# Patient Record
Sex: MALE | Race: WHITE | NOT HISPANIC OR LATINO | Employment: OTHER | URBAN - NONMETROPOLITAN AREA
[De-identification: names, ages, dates, MRNs, and addresses within clinical notes are randomized per-mention and may not be internally consistent; named-entity substitution may affect disease eponyms.]

---

## 2023-05-19 LAB
CHOLESTEROL (MG/DL) IN SER/PLAS: 209 MG/DL (ref 0–199)
TRIGLYCERIDE (MG/DL) IN SER/PLAS: 169 MG/DL (ref 0–149)

## 2023-05-22 ENCOUNTER — OFFICE VISIT (OUTPATIENT)
Dept: PRIMARY CARE | Facility: CLINIC | Age: 78
End: 2023-05-22
Payer: MEDICARE

## 2023-05-22 VITALS
BODY MASS INDEX: 21.9 KG/M2 | DIASTOLIC BLOOD PRESSURE: 52 MMHG | HEART RATE: 49 BPM | HEIGHT: 70 IN | WEIGHT: 153 LBS | OXYGEN SATURATION: 97 % | SYSTOLIC BLOOD PRESSURE: 100 MMHG

## 2023-05-22 DIAGNOSIS — J30.2 SEASONAL ALLERGIC RHINITIS, UNSPECIFIED TRIGGER: ICD-10-CM

## 2023-05-22 DIAGNOSIS — G25.0 ESSENTIAL TREMOR: ICD-10-CM

## 2023-05-22 DIAGNOSIS — E78.00 HYPERCHOLESTEREMIA: ICD-10-CM

## 2023-05-22 DIAGNOSIS — H25.812 COMBINED FORMS OF AGE-RELATED CATARACT OF LEFT EYE: ICD-10-CM

## 2023-05-22 DIAGNOSIS — M47.812 CERVICAL SPONDYLOSIS: ICD-10-CM

## 2023-05-22 DIAGNOSIS — I10 ESSENTIAL HYPERTENSION: ICD-10-CM

## 2023-05-22 DIAGNOSIS — M43.06 LUMBAR SPONDYLOLYSIS: ICD-10-CM

## 2023-05-22 DIAGNOSIS — M16.11 PRIMARY OSTEOARTHRITIS OF RIGHT HIP: ICD-10-CM

## 2023-05-22 DIAGNOSIS — M81.0 AGE-RELATED OSTEOPOROSIS WITHOUT CURRENT PATHOLOGICAL FRACTURE: ICD-10-CM

## 2023-05-22 DIAGNOSIS — K21.9 GASTROESOPHAGEAL REFLUX DISEASE WITHOUT ESOPHAGITIS: ICD-10-CM

## 2023-05-22 DIAGNOSIS — H35.373 EPIRETINAL MEMBRANE (ERM) OF BOTH EYES: ICD-10-CM

## 2023-05-22 DIAGNOSIS — N13.8 BENIGN PROSTATIC HYPERPLASIA WITH URINARY OBSTRUCTION AND OTHER LOWER URINARY TRACT SYMPTOMS: ICD-10-CM

## 2023-05-22 DIAGNOSIS — N40.1 BENIGN PROSTATIC HYPERPLASIA WITH URINARY OBSTRUCTION AND OTHER LOWER URINARY TRACT SYMPTOMS: ICD-10-CM

## 2023-05-22 DIAGNOSIS — Z96.1 PSEUDOPHAKIA OF RIGHT EYE: ICD-10-CM

## 2023-05-22 DIAGNOSIS — Z00.00 ROUTINE GENERAL MEDICAL EXAMINATION AT HEALTH CARE FACILITY: Primary | ICD-10-CM

## 2023-05-22 DIAGNOSIS — M15.9 PRIMARY OSTEOARTHRITIS INVOLVING MULTIPLE JOINTS: ICD-10-CM

## 2023-05-22 DIAGNOSIS — R41.3 AGE-RELATED MEMORY DISORDER: ICD-10-CM

## 2023-05-22 DIAGNOSIS — L57.0 SENILE HYPERKERATOSIS: ICD-10-CM

## 2023-05-22 DIAGNOSIS — Z12.11 COLON CANCER SCREENING: ICD-10-CM

## 2023-05-22 DIAGNOSIS — M77.50 ENTHESOPATHY OF ANKLE: ICD-10-CM

## 2023-05-22 DIAGNOSIS — M75.112 NONTRAUMATIC INCOMPLETE TEAR OF LEFT ROTATOR CUFF: ICD-10-CM

## 2023-05-22 DIAGNOSIS — J45.20 MILD INTERMITTENT REACTIVE AIRWAY DISEASE WITHOUT COMPLICATION (HHS-HCC): ICD-10-CM

## 2023-05-22 PROBLEM — M75.52 BURSITIS OF LEFT SHOULDER: Status: ACTIVE | Noted: 2023-05-22

## 2023-05-22 PROBLEM — F41.1 ANXIETY REACTION: Status: ACTIVE | Noted: 2023-05-22

## 2023-05-22 PROBLEM — Q18.1 CYST ON EAR: Status: RESOLVED | Noted: 2023-05-22 | Resolved: 2023-05-22

## 2023-05-22 PROBLEM — M75.50 BURSITIS AND TENDINITIS OF SHOULDER REGION: Status: RESOLVED | Noted: 2021-08-11 | Resolved: 2023-05-22

## 2023-05-22 PROBLEM — H33.311 HORSESHOE TEAR OF RETINA WITHOUT DETACHMENT, RIGHT EYE: Status: RESOLVED | Noted: 2018-03-20 | Resolved: 2023-05-22

## 2023-05-22 PROBLEM — Q18.1 CYST ON EAR: Status: ACTIVE | Noted: 2023-05-22

## 2023-05-22 PROBLEM — R05.3 CHRONIC COUGH: Status: ACTIVE | Noted: 2023-05-22

## 2023-05-22 PROBLEM — M19.079 ARTHRITIS OF BIG TOE: Status: ACTIVE | Noted: 2023-05-22

## 2023-05-22 PROBLEM — R05.3 CHRONIC COUGH: Status: RESOLVED | Noted: 2023-05-22 | Resolved: 2023-05-22

## 2023-05-22 PROBLEM — J44.9 STAGE 1 MILD COPD BY GOLD CLASSIFICATION (MULTI): Status: ACTIVE | Noted: 2022-06-12

## 2023-05-22 PROBLEM — H53.9 VISUAL DISTURBANCE: Status: RESOLVED | Noted: 2022-03-22 | Resolved: 2023-05-22

## 2023-05-22 PROBLEM — F41.1 ANXIETY REACTION: Status: RESOLVED | Noted: 2023-05-22 | Resolved: 2023-05-22

## 2023-05-22 PROBLEM — J45.909 REACTIVE AIRWAY DISEASE (HHS-HCC): Status: ACTIVE | Noted: 2022-06-12

## 2023-05-22 PROBLEM — H25.10 NUCLEAR SCLEROTIC CATARACT: Status: RESOLVED | Noted: 2018-03-20 | Resolved: 2023-05-22

## 2023-05-22 PROBLEM — M47.817 FACET ARTHROPATHY, LUMBOSACRAL: Status: ACTIVE | Noted: 2023-05-22

## 2023-05-22 PROBLEM — M48.062 NEUROGENIC CLAUDICATION DUE TO LUMBAR SPINAL STENOSIS: Status: RESOLVED | Noted: 2023-05-22 | Resolved: 2023-05-22

## 2023-05-22 PROBLEM — H26.491 PCO (POSTERIOR CAPSULAR OPACIFICATION), RIGHT: Status: ACTIVE | Noted: 2021-03-17

## 2023-05-22 PROBLEM — H33.311 HORSESHOE TEAR OF RETINA WITHOUT DETACHMENT, RIGHT EYE: Status: ACTIVE | Noted: 2018-03-20

## 2023-05-22 PROBLEM — G57.60 MORTON METATARSALGIA: Status: RESOLVED | Noted: 2023-05-22 | Resolved: 2023-05-22

## 2023-05-22 PROBLEM — M48.062 NEUROGENIC CLAUDICATION DUE TO LUMBAR SPINAL STENOSIS: Status: ACTIVE | Noted: 2023-05-22

## 2023-05-22 PROBLEM — M25.851 RIGHT HIP IMPINGEMENT SYNDROME: Status: RESOLVED | Noted: 2023-05-22 | Resolved: 2023-05-22

## 2023-05-22 PROBLEM — H53.9 VISUAL DISTURBANCE: Status: ACTIVE | Noted: 2022-03-22

## 2023-05-22 PROBLEM — G57.60 MORTON METATARSALGIA: Status: ACTIVE | Noted: 2023-05-22

## 2023-05-22 PROBLEM — M46.1 SACROILIITIS (CMS-HCC): Status: ACTIVE | Noted: 2023-05-22

## 2023-05-22 PROBLEM — M47.817 FACET ARTHROPATHY, LUMBOSACRAL: Status: RESOLVED | Noted: 2023-05-22 | Resolved: 2023-05-22

## 2023-05-22 PROBLEM — M75.50 BURSITIS AND TENDINITIS OF SHOULDER REGION: Status: ACTIVE | Noted: 2021-08-11

## 2023-05-22 PROBLEM — R35.1 NOCTURIA: Status: ACTIVE | Noted: 2023-05-22

## 2023-05-22 PROBLEM — J44.9 STAGE 1 MILD COPD BY GOLD CLASSIFICATION (MULTI): Status: RESOLVED | Noted: 2022-06-12 | Resolved: 2023-05-22

## 2023-05-22 PROBLEM — M25.851 RIGHT HIP IMPINGEMENT SYNDROME: Status: ACTIVE | Noted: 2023-05-22

## 2023-05-22 PROBLEM — M85.80 OSTEOPENIA: Status: ACTIVE | Noted: 2023-05-22

## 2023-05-22 PROBLEM — I45.2 RBBB (RIGHT BUNDLE BRANCH BLOCK WITH LEFT ANTERIOR FASCICULAR BLOCK): Status: ACTIVE | Noted: 2019-03-18

## 2023-05-22 PROBLEM — H25.10 NUCLEAR SCLEROTIC CATARACT: Status: ACTIVE | Noted: 2018-03-20

## 2023-05-22 PROBLEM — M46.1 SACROILIITIS (CMS-HCC): Status: RESOLVED | Noted: 2023-05-22 | Resolved: 2023-05-22

## 2023-05-22 PROBLEM — Z98.890 HISTORY OF REPAIR OF RETINAL TEAR BY LASER PHOTOCOAGULATION: Status: ACTIVE | Noted: 2023-03-29

## 2023-05-22 PROBLEM — M75.90 BURSITIS AND TENDINITIS OF SHOULDER REGION: Status: RESOLVED | Noted: 2021-08-11 | Resolved: 2023-05-22

## 2023-05-22 PROBLEM — R35.1 NOCTURIA: Status: RESOLVED | Noted: 2023-05-22 | Resolved: 2023-05-22

## 2023-05-22 PROBLEM — M75.90 BURSITIS AND TENDINITIS OF SHOULDER REGION: Status: ACTIVE | Noted: 2021-08-11

## 2023-05-22 PROBLEM — M19.90 OSTEOARTHRITIS: Status: ACTIVE | Noted: 2023-05-22

## 2023-05-22 PROBLEM — H26.491 PCO (POSTERIOR CAPSULAR OPACIFICATION), RIGHT: Status: RESOLVED | Noted: 2021-03-17 | Resolved: 2023-05-22

## 2023-05-22 PROBLEM — M85.80 OSTEOPENIA: Status: RESOLVED | Noted: 2023-05-22 | Resolved: 2023-05-22

## 2023-05-22 PROBLEM — Z98.890 HISTORY OF REPAIR OF RETINAL TEAR BY LASER PHOTOCOAGULATION: Status: RESOLVED | Noted: 2023-03-29 | Resolved: 2023-05-22

## 2023-05-22 PROCEDURE — 1160F RVW MEDS BY RX/DR IN RCRD: CPT | Performed by: FAMILY MEDICINE

## 2023-05-22 PROCEDURE — 1036F TOBACCO NON-USER: CPT | Performed by: FAMILY MEDICINE

## 2023-05-22 PROCEDURE — 1170F FXNL STATUS ASSESSED: CPT | Performed by: FAMILY MEDICINE

## 2023-05-22 PROCEDURE — 3074F SYST BP LT 130 MM HG: CPT | Performed by: FAMILY MEDICINE

## 2023-05-22 PROCEDURE — 99214 OFFICE O/P EST MOD 30 MIN: CPT | Performed by: FAMILY MEDICINE

## 2023-05-22 PROCEDURE — 3078F DIAST BP <80 MM HG: CPT | Performed by: FAMILY MEDICINE

## 2023-05-22 PROCEDURE — 1159F MED LIST DOCD IN RCRD: CPT | Performed by: FAMILY MEDICINE

## 2023-05-22 PROCEDURE — G0439 PPPS, SUBSEQ VISIT: HCPCS | Performed by: FAMILY MEDICINE

## 2023-05-22 RX ORDER — FAMOTIDINE 20 MG/1
20 TABLET, FILM COATED ORAL DAILY
COMMUNITY

## 2023-05-22 RX ORDER — ALBUTEROL SULFATE 90 UG/1
2 AEROSOL, METERED RESPIRATORY (INHALATION) EVERY 6 HOURS PRN
COMMUNITY
Start: 2022-05-17 | End: 2024-03-20

## 2023-05-22 RX ORDER — CETIRIZINE HYDROCHLORIDE, PSEUDOEPHEDRINE HYDROCHLORIDE 5; 120 MG/1; MG/1
1 TABLET, FILM COATED, EXTENDED RELEASE ORAL DAILY
COMMUNITY

## 2023-05-22 RX ORDER — FLUTICASONE PROPIONATE 50 MCG
2 SPRAY, SUSPENSION (ML) NASAL DAILY
COMMUNITY
Start: 2017-07-25

## 2023-05-22 RX ORDER — MAGNESIUM 250 MG
250 TABLET ORAL DAILY
COMMUNITY

## 2023-05-22 RX ORDER — AMLODIPINE BESYLATE 5 MG/1
5 TABLET ORAL DAILY
COMMUNITY
Start: 2020-04-08

## 2023-05-22 RX ORDER — ISOSORBIDE MONONITRATE 30 MG/1
30 TABLET, EXTENDED RELEASE ORAL DAILY
Qty: 30 TABLET | Refills: 11 | COMMUNITY
Start: 2023-05-10 | End: 2024-05-09

## 2023-05-22 RX ORDER — ALFUZOSIN HYDROCHLORIDE 10 MG/1
10 TABLET, EXTENDED RELEASE ORAL DAILY
COMMUNITY
Start: 2021-02-01 | End: 2023-12-15 | Stop reason: ALTCHOICE

## 2023-05-22 RX ORDER — MONTELUKAST SODIUM 10 MG/1
10 TABLET ORAL DAILY PRN
Qty: 30 TABLET | Refills: 23 | COMMUNITY
Start: 2022-04-19 | End: 2024-03-20

## 2023-05-22 RX ORDER — VITAMIN E 268 MG
400 CAPSULE ORAL DAILY
COMMUNITY

## 2023-05-22 RX ORDER — NITROGLYCERIN 0.4 MG/1
0.4 TABLET SUBLINGUAL EVERY 5 MIN PRN
COMMUNITY
Start: 2014-09-30

## 2023-05-22 RX ORDER — LORATADINE 10 MG/1
10 TABLET ORAL DAILY
Qty: 30 TABLET | Refills: 23 | COMMUNITY
Start: 2022-04-19 | End: 2024-03-20

## 2023-05-22 RX ORDER — ZINC GLUCONATE 50 MG
1 TABLET ORAL DAILY
COMMUNITY

## 2023-05-22 RX ORDER — VITAMIN B COMPLEX
1 CAPSULE ORAL DAILY
COMMUNITY

## 2023-05-22 RX ORDER — ACETAMINOPHEN 500 MG
2500 TABLET ORAL 2 TIMES DAILY
COMMUNITY

## 2023-05-22 RX ORDER — FLAXSEED OIL 1000 MG
1 CAPSULE ORAL 2 TIMES DAILY
COMMUNITY
Start: 2021-05-13 | End: 2023-11-16 | Stop reason: ALTCHOICE

## 2023-05-22 ASSESSMENT — PATIENT HEALTH QUESTIONNAIRE - PHQ9
2. FEELING DOWN, DEPRESSED OR HOPELESS: NOT AT ALL
SUM OF ALL RESPONSES TO PHQ9 QUESTIONS 1 AND 2: 0
1. LITTLE INTEREST OR PLEASURE IN DOING THINGS: NOT AT ALL
SUM OF ALL RESPONSES TO PHQ9 QUESTIONS 1 AND 2: 0
2. FEELING DOWN, DEPRESSED OR HOPELESS: NOT AT ALL
1. LITTLE INTEREST OR PLEASURE IN DOING THINGS: NOT AT ALL

## 2023-05-22 ASSESSMENT — ACTIVITIES OF DAILY LIVING (ADL)
TAKING_MEDICATION: INDEPENDENT
GROCERY_SHOPPING: INDEPENDENT
BATHING: INDEPENDENT
MANAGING_FINANCES: INDEPENDENT
DRESSING: INDEPENDENT
DOING_HOUSEWORK: INDEPENDENT

## 2023-05-22 NOTE — PROGRESS NOTES
Subjective   Reason for Visit: Gomez Abreu is an 77 y.o. male here for a Medicare Wellness visit.     Past Medical, Surgical, and Family History reviewed and updated in chart.    Reviewed all medications by prescribing practitioner or clinical pharmacist (such as prescriptions, OTCs, herbal therapies and supplements) and documented in the medical record.    HPI  BPH - Nocturia X 0-1. Dr Anguiano changed Alfuzosin. After surgery his flow had been slower then better but now slower in the night. No daytime issues.  Dr Anguiano soon. PSA 1.34  last fall.      Cataract, nuclear sclerotic, both eyes - still mild on left but has the epiretinal membrane on the right. Surgery stopped progress. He no longer experiences spikes of light in left eye where he still has a cataract. Retinal specialist every year. Had lens replaced on right.      Lumbar spondylolysis and Cervicalgia - had a flare up in low back in September. Was put on Ketorolac. Had to stop after 4 days due to feeling of ulcers in stomach. Then used the medication daily for 6 days and that did help. Now that he is off of them has stiffness in AM every day. Discomfort all the time.  Better with a lift on left as the leg is shorter.  Chiropractor helped for years but no longer. Xray shows arthritis. Dr Lawson did do XRays of hip and shows DJD. Has been to Dr Brennan for neck and injections did not help. Neck has had a multitude of treatments without success so will just do the stretching. PT for pain in shoulder has aggravated this. Does traction which which did help but no longer.  Has had PT for the low back and does the exercises.       Coronary atherosclerosis of native coronary artery - The chest pain that he had been having that eluded diagnosis is doing better but still there on occasion if active. Isosobide mononitrate has helped some. Negative evaluation by Dr Moura and now Dr Hammer. Referred to pulmonology and she is treating for exercise induced  asthma with albuterol. Some  improvement. Had EGD as well. But seems to be stress related. Walking chest pain at start then goes away most of the time. But still has times he needs to sit. BP good. Had EECP and was put on Amlodipine and Losartan at that time. Dr Hammer took him off of Losartan.   Goal 120s/70s. Can try off of amlodipine starting at 1/2 pill.      Enthesopathy of ankle AND/OR tarsus - pains off and on. None recent. Does not stretch daily.  Since stopping push mower has been better.      Essential tremor - has been not too bad. Able to eat and write. 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      GERD (gastroesophageal reflux disease) - has the chest pain and off of on as above. Pepcid now. No HB, Melena, dysphagia, or hematochezia. Occasional reflux of acid     Hypercholesterolemia -  a bit better this time.      Levator spasm - seems to be doing well now on no meds. Just relaxation.      Osteoarthritis of multiple joints  - right foot, left elbow, left thumb. o longer an issue when driving. Now hip on right. Cannot take NSAIDS due to stomach pain. Wild lettuce did not help Turmeric may help. Still hip pain but able to be active as he wants.     Osteoporosis - better last time after he was off of Fosamax. So will monitor. October 2020. Consider Prolia. Would like to hold on follow up      Perennial allergic rhinitis - Back on Flonase in summer and spring as needed. Doing well. On Singulair and Claritin as well.  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 Seems to come back      Memory issues with word finding. Hunting for synonym all the time. Doing puzzles. Concerned due to FMH of several with dementia. Doing some preaching. Has not done an MRI. Six Cit score 0. No focal numbness or weakness      Left shoulder just had surgery on October 26, 2021. Dr Lawson. Bone spurs and bursitis. Rotator cuff repair. Less painful.. Now some on right .    Solar Keratoses on  "temples. Dermatologist      Colonoscopy 8/23/13.. He could like to do that in July. Cologuard   PSA 11/14/22   DEXA 10/30/20. Will order   LW and DPA - wife Nadine and daughter     Patient Care Team:  Maikel Ramon MD as PCP - General  [facility] Ram[facility]     Review of Systems    Objective   Vitals:  /52 (BP Location: Left arm, Patient Position: Sitting)   Pulse (!) 49   Ht 1.765 m (5' 9.5\")   Wt 69.4 kg (153 lb)   SpO2 97%   BMI 22.27 kg/m²       Physical Exam  Vitals reviewed.   Constitutional:       General: He is not in acute distress.     Appearance: Normal appearance.   HENT:      Head: Normocephalic.      Right Ear: Tympanic membrane normal.      Left Ear: Tympanic membrane normal.      Nose: Nose normal.      Mouth/Throat:      Pharynx: Oropharynx is clear.   Eyes:      Extraocular Movements: Extraocular movements intact.      Conjunctiva/sclera: Conjunctivae normal.      Pupils: Pupils are equal, round, and reactive to light.   Neck:      Vascular: No carotid bruit.   Cardiovascular:      Rate and Rhythm: Normal rate and regular rhythm.      Pulses: Normal pulses.      Heart sounds: Normal heart sounds. No murmur heard.  Pulmonary:      Effort: Pulmonary effort is normal. No respiratory distress.      Breath sounds: Normal breath sounds.   Abdominal:      General: Abdomen is flat. Bowel sounds are normal. There is no distension.      Palpations: Abdomen is soft. There is no mass.      Tenderness: There is no abdominal tenderness.   Musculoskeletal:         General: Tenderness (left elbow and thumb CMC) present. Normal range of motion.      Cervical back: Normal range of motion and neck supple. No tenderness.      Comments: Pain right hip with IR and ER    Lymphadenopathy:      Cervical: No cervical adenopathy.   Skin:     General: Skin is warm and dry.      Findings: Lesion (SK on right cheek. AK on both temples.) present. No rash.   Neurological:      " General: No focal deficit present.      Mental Status: He is alert and oriented to person, place, and time.   Psychiatric:         Mood and Affect: Mood normal.         Thought Content: Thought content normal.         Judgment: Judgment normal.         Assessment/Plan   Problem List Items Addressed This Visit       Age-related memory disorder    Benign prostatic hyperplasia with urinary obstruction and other lower urinary tract symptoms    Relevant Orders    Comprehensive Metabolic Panel    PSA    Cervical spondylosis    Combined forms of age-related cataract of left eye    Enthesopathy of ankle    Epiretinal membrane (ERM) of both eyes    Essential hypertension    Overview     Last Assessment & Plan: Formatting of this note might be different from the original. Discussed and he will try stopping Losartan         Essential tremor    GERD (gastroesophageal reflux disease)    Hypercholesteremia    Relevant Orders    Lipid Panel    Lumbar spondylolysis    Nontraumatic incomplete tear of left rotator cuff    Overview     Formatting of this note might be different from the original. Added automatically from request for surgery [id number]         Osteoarthritis    Osteoporosis    Relevant Orders    XR DEXA bone density    Primary osteoarthritis of right hip    Pseudophakia of right eye    Reactive airway disease    Seasonal allergic rhinitis    Senile hyperkeratosis     Other Visit Diagnoses       Routine general medical examination at health care facility    -  Primary    Colon cancer screening        Relevant Orders    Cologuard® colon cancer screening

## 2023-06-05 ENCOUNTER — TELEPHONE (OUTPATIENT)
Dept: PRIMARY CARE | Facility: CLINIC | Age: 78
End: 2023-06-05
Payer: MEDICARE

## 2023-06-05 NOTE — TELEPHONE ENCOUNTER
----- Message from Maikel Ramon MD sent at 6/5/2023  3:33 PM EDT -----  Let him know the bone density continues to improve. Keep on medication as still a little low on calcium in the bone.

## 2023-06-06 ENCOUNTER — TELEPHONE (OUTPATIENT)
Dept: PRIMARY CARE | Facility: CLINIC | Age: 78
End: 2023-06-06
Payer: MEDICARE

## 2023-06-06 LAB — NONINV COLON CA DNA+OCC BLD SCRN STL QL: NEGATIVE

## 2023-06-06 NOTE — TELEPHONE ENCOUNTER
----- Message from Maikel Ramon MD sent at 6/6/2023 12:34 PM EDT -----  Let the patient know the Cologuard Screen was negative.  So there is a 99% chance that they do not have cancer.  They should redo the exam in 3 years.

## 2023-06-19 ENCOUNTER — APPOINTMENT (OUTPATIENT)
Dept: URBAN - METROPOLITAN AREA CLINIC 204 | Age: 78
Setting detail: DERMATOLOGY
End: 2023-06-20

## 2023-06-19 DIAGNOSIS — L40.0 PSORIASIS VULGARIS: ICD-10-CM

## 2023-06-19 PROCEDURE — 17003 DESTRUCT PREMALG LES 2-14: CPT

## 2023-06-19 PROCEDURE — 99213 OFFICE O/P EST LOW 20 MIN: CPT | Mod: 25

## 2023-06-19 PROCEDURE — 17000 DESTRUCT PREMALG LESION: CPT

## 2023-06-19 PROCEDURE — OTHER MIPS QUALITY: OTHER

## 2023-06-19 ASSESSMENT — ITCH NUMERIC RATING SCALE: HOW SEVERE IS YOUR ITCHING?: 1

## 2023-06-19 ASSESSMENT — LOCATION SIMPLE DESCRIPTION DERM: LOCATION SIMPLE: ABDOMEN

## 2023-06-19 ASSESSMENT — LOCATION DETAILED DESCRIPTION DERM: LOCATION DETAILED: PERIUMBILICAL SKIN

## 2023-06-19 ASSESSMENT — BSA PSORIASIS: % BODY COVERED IN PSORIASIS: 1

## 2023-06-19 ASSESSMENT — LOCATION ZONE DERM: LOCATION ZONE: TRUNK

## 2023-08-01 PROBLEM — H81.01 MENIERE'S DISEASE OF RIGHT EAR: Status: ACTIVE | Noted: 2023-08-01

## 2023-08-08 RX ORDER — EZETIMIBE 10 MG/1
10 TABLET ORAL DAILY
Qty: 30 TABLET | Refills: 11 | COMMUNITY
Start: 2023-08-08 | End: 2024-08-07

## 2023-08-24 ENCOUNTER — APPOINTMENT (OUTPATIENT)
Dept: URBAN - METROPOLITAN AREA CLINIC 204 | Age: 78
Setting detail: DERMATOLOGY
End: 2023-08-28

## 2023-08-24 PROCEDURE — 17000 DESTRUCT PREMALG LESION: CPT

## 2023-08-24 PROCEDURE — OTHER MIPS QUALITY: OTHER

## 2023-09-21 ENCOUNTER — TELEPHONE (OUTPATIENT)
Dept: PRIMARY CARE | Facility: CLINIC | Age: 78
End: 2023-09-21
Payer: MEDICARE

## 2023-09-21 NOTE — TELEPHONE ENCOUNTER
Called patient, he said in the last few hours he's felt much better, but was concerned about his wife, and didn't know if there was anything she can take. I told them without an apt or being seen or without SX there was really nothing we can. But let him know she can take over the counter medication like Vitamins

## 2023-09-21 NOTE — TELEPHONE ENCOUNTER
Stating he's been sick for the last couple of days. Feels like it did when he had covid. Asking if there is any medication he can take. He is going on vacation October 1. Asking for a call back.

## 2023-11-01 ENCOUNTER — APPOINTMENT (OUTPATIENT)
Dept: UROLOGY | Facility: CLINIC | Age: 78
End: 2023-11-01
Payer: MEDICARE

## 2023-11-14 ASSESSMENT — ENCOUNTER SYMPTOMS
SORE THROAT: 1
COUGH: 1

## 2023-11-16 ENCOUNTER — OFFICE VISIT (OUTPATIENT)
Dept: PRIMARY CARE | Facility: CLINIC | Age: 78
End: 2023-11-16
Payer: MEDICARE

## 2023-11-16 VITALS
OXYGEN SATURATION: 97 % | SYSTOLIC BLOOD PRESSURE: 126 MMHG | BODY MASS INDEX: 21.83 KG/M2 | DIASTOLIC BLOOD PRESSURE: 78 MMHG | WEIGHT: 150 LBS | HEART RATE: 59 BPM

## 2023-11-16 DIAGNOSIS — J01.41 ACUTE RECURRENT PANSINUSITIS: Primary | ICD-10-CM

## 2023-11-16 PROBLEM — H33.311 HORSESHOE RETINAL TEAR OF RIGHT EYE: Status: ACTIVE | Noted: 2023-11-16

## 2023-11-16 PROBLEM — M62.838 LEVATOR SPASM: Status: ACTIVE | Noted: 2023-11-16

## 2023-11-16 PROBLEM — H35.379 EPIRETINAL MEMBRANE: Status: ACTIVE | Noted: 2023-11-16

## 2023-11-16 PROCEDURE — 1159F MED LIST DOCD IN RCRD: CPT | Performed by: FAMILY MEDICINE

## 2023-11-16 PROCEDURE — 99213 OFFICE O/P EST LOW 20 MIN: CPT | Performed by: FAMILY MEDICINE

## 2023-11-16 PROCEDURE — 1160F RVW MEDS BY RX/DR IN RCRD: CPT | Performed by: FAMILY MEDICINE

## 2023-11-16 PROCEDURE — 1036F TOBACCO NON-USER: CPT | Performed by: FAMILY MEDICINE

## 2023-11-16 PROCEDURE — 3078F DIAST BP <80 MM HG: CPT | Performed by: FAMILY MEDICINE

## 2023-11-16 PROCEDURE — 3074F SYST BP LT 130 MM HG: CPT | Performed by: FAMILY MEDICINE

## 2023-11-16 RX ORDER — TAMSULOSIN HYDROCHLORIDE 0.4 MG/1
0.4 CAPSULE ORAL DAILY
COMMUNITY
Start: 2023-09-11 | End: 2023-11-16 | Stop reason: ALTCHOICE

## 2023-11-16 RX ORDER — AMOXICILLIN AND CLAVULANATE POTASSIUM 875; 125 MG/1; MG/1
875 TABLET, FILM COATED ORAL 2 TIMES DAILY
Qty: 10 TABLET | Refills: 1 | Status: SHIPPED | OUTPATIENT
Start: 2023-11-16 | End: 2023-11-21

## 2023-11-16 RX ORDER — CODEINE PHOSPHATE AND GUAIFENESIN 10; 100 MG/5ML; MG/5ML
5 SOLUTION ORAL EVERY 6 HOURS PRN
Qty: 240 ML | Refills: 0 | Status: SHIPPED | OUTPATIENT
Start: 2023-11-16 | End: 2023-11-23

## 2023-11-16 ASSESSMENT — ENCOUNTER SYMPTOMS
SORE THROAT: 1
COUGH: 1

## 2023-11-16 NOTE — PROGRESS NOTES
Subjective   Patient ID: Gomez Abreu is a 77 y.o. male who presents for Sore Throat (Weakness, joints ache, cough, decrease in energy, sneezing).    Sore Throat   The current episode started more than 1 month ago. The problem has been unchanged. Neither side of throat is experiencing more pain than the other. The pain is at a severity of 6/10. Associated symptoms include coughing. He has had no exposure to strep or mono.      Has been sick for 3 months off and on. Coming and going. This time over a week.   ST, Achy, weakness, sneezing, SN, RN,PND, Cough  No fever or dyspnea, wheezing, headache  Mucus is green and yellow and thick  Calms at hs. Lozenges do not help.       Review of Systems   HENT:  Positive for sore throat.    Respiratory:  Positive for cough.        Objective   /78 (BP Location: Left arm, Patient Position: Sitting)   Pulse 59   Wt 68 kg (150 lb)   SpO2 97%   BMI 21.83 kg/m²     Physical Exam  Constitutional:       Appearance: Normal appearance.   HENT:      Head: Normocephalic.      Right Ear: Tympanic membrane, ear canal and external ear normal.      Left Ear: Tympanic membrane, ear canal and external ear normal.      Nose: Nose normal.      Mouth/Throat:      Mouth: Mucous membranes are moist.      Pharynx: Oropharynx is clear.      Comments: Sinus tenderness not noted   Eyes:      Extraocular Movements: Extraocular movements intact.      Conjunctiva/sclera: Conjunctivae normal.      Pupils: Pupils are equal, round, and reactive to light.   Cardiovascular:      Rate and Rhythm: Normal rate and regular rhythm.   Pulmonary:      Effort: Pulmonary effort is normal. No respiratory distress.      Breath sounds: Normal breath sounds. No wheezing or rhonchi.      Comments: Frequent cough   Musculoskeletal:      Cervical back: Normal range of motion and neck supple.   Neurological:      General: No focal deficit present.      Mental Status: He is alert and oriented to person, place, and  time.   Psychiatric:         Mood and Affect: Mood normal.         Behavior: Behavior normal.         Thought Content: Thought content normal.         Judgment: Judgment normal.         Assessment/Plan   Problem List Items Addressed This Visit    None  Visit Diagnoses         Codes    Acute recurrent pansinusitis    -  Primary J01.41    Relevant Medications    amoxicillin-pot clavulanate (Augmentin) 875-125 mg tablet    codeine-guaifenesin (Robitussin-AC)  mg/5 mL syrup

## 2023-11-29 ENCOUNTER — APPOINTMENT (OUTPATIENT)
Dept: PRIMARY CARE | Facility: CLINIC | Age: 78
End: 2023-11-29
Payer: MEDICARE

## 2023-12-12 ENCOUNTER — LAB (OUTPATIENT)
Dept: LAB | Facility: LAB | Age: 78
End: 2023-12-12
Payer: MEDICARE

## 2023-12-12 DIAGNOSIS — N13.8 BENIGN PROSTATIC HYPERPLASIA WITH URINARY OBSTRUCTION AND OTHER LOWER URINARY TRACT SYMPTOMS: ICD-10-CM

## 2023-12-12 DIAGNOSIS — N40.1 BENIGN PROSTATIC HYPERPLASIA WITH URINARY OBSTRUCTION AND OTHER LOWER URINARY TRACT SYMPTOMS: ICD-10-CM

## 2023-12-12 DIAGNOSIS — E78.00 HYPERCHOLESTEREMIA: ICD-10-CM

## 2023-12-12 LAB
ALBUMIN SERPL BCP-MCNC: 4.6 G/DL (ref 3.4–5)
ALP SERPL-CCNC: 33 U/L (ref 33–136)
ALT SERPL W P-5'-P-CCNC: 21 U/L (ref 10–52)
ANION GAP SERPL CALC-SCNC: 10 MMOL/L (ref 10–20)
AST SERPL W P-5'-P-CCNC: 23 U/L (ref 9–39)
BILIRUB SERPL-MCNC: 0.5 MG/DL (ref 0–1.2)
BUN SERPL-MCNC: 18 MG/DL (ref 6–23)
CALCIUM SERPL-MCNC: 9.8 MG/DL (ref 8.6–10.3)
CHLORIDE SERPL-SCNC: 106 MMOL/L (ref 98–107)
CHOLEST SERPL-MCNC: 187 MG/DL (ref 0–199)
CHOLESTEROL/HDL RATIO: 4.5
CO2 SERPL-SCNC: 30 MMOL/L (ref 21–32)
CREAT SERPL-MCNC: 1.1 MG/DL (ref 0.5–1.3)
GFR SERPL CREATININE-BSD FRML MDRD: 69 ML/MIN/1.73M*2
GLUCOSE SERPL-MCNC: 89 MG/DL (ref 74–99)
HDLC SERPL-MCNC: 42 MG/DL
LDLC SERPL CALC-MCNC: 116 MG/DL
NON HDL CHOLESTEROL: 145 MG/DL (ref 0–149)
POTASSIUM SERPL-SCNC: 4.1 MMOL/L (ref 3.5–5.3)
PROT SERPL-MCNC: 7 G/DL (ref 6.4–8.2)
PSA SERPL-MCNC: 1.58 NG/ML
SODIUM SERPL-SCNC: 142 MMOL/L (ref 136–145)
TRIGL SERPL-MCNC: 147 MG/DL (ref 0–149)
VLDL: 29 MG/DL (ref 0–40)

## 2023-12-12 PROCEDURE — 80061 LIPID PANEL: CPT

## 2023-12-12 PROCEDURE — 84153 ASSAY OF PSA TOTAL: CPT

## 2023-12-12 PROCEDURE — 36415 COLL VENOUS BLD VENIPUNCTURE: CPT

## 2023-12-12 PROCEDURE — 80053 COMPREHEN METABOLIC PANEL: CPT

## 2023-12-15 ENCOUNTER — OFFICE VISIT (OUTPATIENT)
Dept: PRIMARY CARE | Facility: CLINIC | Age: 78
End: 2023-12-15
Payer: MEDICARE

## 2023-12-15 VITALS
HEART RATE: 58 BPM | SYSTOLIC BLOOD PRESSURE: 104 MMHG | WEIGHT: 153 LBS | OXYGEN SATURATION: 97 % | DIASTOLIC BLOOD PRESSURE: 60 MMHG | BODY MASS INDEX: 22.27 KG/M2

## 2023-12-15 DIAGNOSIS — M81.0 AGE-RELATED OSTEOPOROSIS WITHOUT CURRENT PATHOLOGICAL FRACTURE: ICD-10-CM

## 2023-12-15 DIAGNOSIS — J30.2 SEASONAL ALLERGIC RHINITIS, UNSPECIFIED TRIGGER: ICD-10-CM

## 2023-12-15 DIAGNOSIS — Z23 IMMUNIZATION DUE: ICD-10-CM

## 2023-12-15 DIAGNOSIS — J45.20 MILD INTERMITTENT REACTIVE AIRWAY DISEASE WITHOUT COMPLICATION (HHS-HCC): ICD-10-CM

## 2023-12-15 DIAGNOSIS — M15.9 PRIMARY OSTEOARTHRITIS INVOLVING MULTIPLE JOINTS: Primary | ICD-10-CM

## 2023-12-15 DIAGNOSIS — Z96.1 PSEUDOPHAKIA OF RIGHT EYE: ICD-10-CM

## 2023-12-15 DIAGNOSIS — I10 ESSENTIAL HYPERTENSION: ICD-10-CM

## 2023-12-15 DIAGNOSIS — K21.9 GASTROESOPHAGEAL REFLUX DISEASE WITHOUT ESOPHAGITIS: ICD-10-CM

## 2023-12-15 DIAGNOSIS — E78.00 HYPERCHOLESTEREMIA: ICD-10-CM

## 2023-12-15 DIAGNOSIS — N13.8 BENIGN PROSTATIC HYPERPLASIA WITH URINARY OBSTRUCTION AND OTHER LOWER URINARY TRACT SYMPTOMS: ICD-10-CM

## 2023-12-15 DIAGNOSIS — M47.812 CERVICAL SPONDYLOSIS: ICD-10-CM

## 2023-12-15 DIAGNOSIS — G25.0 ESSENTIAL TREMOR: ICD-10-CM

## 2023-12-15 DIAGNOSIS — M62.838 LEVATOR SPASM: ICD-10-CM

## 2023-12-15 DIAGNOSIS — N40.1 BENIGN PROSTATIC HYPERPLASIA WITH URINARY OBSTRUCTION AND OTHER LOWER URINARY TRACT SYMPTOMS: ICD-10-CM

## 2023-12-15 PROCEDURE — 3074F SYST BP LT 130 MM HG: CPT | Performed by: FAMILY MEDICINE

## 2023-12-15 PROCEDURE — 3078F DIAST BP <80 MM HG: CPT | Performed by: FAMILY MEDICINE

## 2023-12-15 PROCEDURE — 1036F TOBACCO NON-USER: CPT | Performed by: FAMILY MEDICINE

## 2023-12-15 PROCEDURE — 1160F RVW MEDS BY RX/DR IN RCRD: CPT | Performed by: FAMILY MEDICINE

## 2023-12-15 PROCEDURE — G0008 ADMIN INFLUENZA VIRUS VAC: HCPCS | Performed by: FAMILY MEDICINE

## 2023-12-15 PROCEDURE — 90662 IIV NO PRSV INCREASED AG IM: CPT | Performed by: FAMILY MEDICINE

## 2023-12-15 PROCEDURE — 99214 OFFICE O/P EST MOD 30 MIN: CPT | Performed by: FAMILY MEDICINE

## 2023-12-15 PROCEDURE — 1159F MED LIST DOCD IN RCRD: CPT | Performed by: FAMILY MEDICINE

## 2023-12-15 RX ORDER — MELOXICAM 15 MG/1
15 TABLET ORAL DAILY PRN
Qty: 30 TABLET | Refills: 1 | Status: SHIPPED | OUTPATIENT
Start: 2023-12-15 | End: 2024-12-14

## 2023-12-15 RX ORDER — TAMSULOSIN HYDROCHLORIDE 0.4 MG/1
0.4 CAPSULE ORAL DAILY
COMMUNITY

## 2023-12-15 NOTE — PROGRESS NOTES
Subjective   Patient ID: Gomez Abreu is a 78 y.o. male who presents for Follow-up (Pain in elbows and wrists, dizziness at times).    HPI   Feeling better from sinusitis. Mostly resolved from a month ago. Did the refill on the Augmentin.   Pain in the elbows and wrists.  Not swollen. Sharp pain. Stiff most of the time. Legs and knees feel stiff a lot. Not red. This has been worse since October when carrying suitcases. Has not taken any medication for this.. GFR is good.   Memory is stable. Word games.   BPH nocturia. PSA 1.58. Slow stream when on a cruise for some reason. . Dr Anguiano. Home on catheter as needed. Tamsulosin helps.  Also on Alfuzosin.   Hyperlipidemia better on Zetia.  222 down to 187.    HTN - No Dyspnea, palpitations, numbness, weakness, claudications, or double vision/ loss of vision. Chest pain daily with joints   Cataracts - slowly worse on left. Pseudophakia good on right   Essential tremor mild and not progression  GERD -on Pepcid daily   Levator spasm none since spring   Osteoporosis - Calcium and D and pretty good in May.  Off Fosamax   Seasonal allergies and asthma - good now   Dizziness the last few weeks with room spinning. Was on a trip for awhile.  Will try off of Zetia.  Can try Epley Maneuver.  No focal numbness or weakness. History of Meniere's     Review of Systems    Objective   /60 (BP Location: Left arm, Patient Position: Sitting)   Pulse 58   Wt 69.4 kg (153 lb)   SpO2 97%   BMI 22.27 kg/m²     Physical Exam  Vitals reviewed.   Constitutional:       General: He is not in acute distress.     Appearance: Normal appearance.   HENT:      Head: Normocephalic.      Right Ear: Tympanic membrane, ear canal and external ear normal.      Left Ear: Tympanic membrane, ear canal and external ear normal.      Nose: Nose normal.      Mouth/Throat:      Pharynx: Oropharynx is clear.   Eyes:      Extraocular Movements: Extraocular movements intact.      Conjunctiva/sclera:  Conjunctivae normal.      Pupils: Pupils are equal, round, and reactive to light.      Comments: No nystagmus    Neck:      Vascular: No carotid bruit.   Cardiovascular:      Rate and Rhythm: Normal rate and regular rhythm.      Pulses: Normal pulses.      Heart sounds: Normal heart sounds. No murmur heard.  Pulmonary:      Effort: Pulmonary effort is normal. No respiratory distress.      Breath sounds: Normal breath sounds.   Abdominal:      General: Abdomen is flat. Bowel sounds are normal. There is no distension.      Palpations: Abdomen is soft. There is no mass.      Tenderness: There is no abdominal tenderness.   Musculoskeletal:      Cervical back: Normal range of motion and neck supple. No tenderness.   Lymphadenopathy:      Cervical: No cervical adenopathy.   Skin:     General: Skin is warm and dry.      Findings: No rash.   Neurological:      General: No focal deficit present.      Mental Status: He is alert and oriented to person, place, and time.   Psychiatric:         Mood and Affect: Mood normal.         Thought Content: Thought content normal.         Judgment: Judgment normal.     Finger to nose WNL    Assessment/Plan   Diagnoses and all orders for this visit:  Primary osteoarthritis involving multiple joints  -     meloxicam (Mobic) 15 mg tablet; Take 1 tablet (15 mg) by mouth once daily as needed for moderate pain (4 - 6) (arthritis flare).  Benign prostatic hyperplasia with urinary obstruction and other lower urinary tract symptoms  Cervical spondylosis  Essential hypertension  -     CBC; Future  -     Comprehensive Metabolic Panel; Future  -     Follow Up In Primary Care - Established; Future  Essential tremor  Gastroesophageal reflux disease without esophagitis  Hypercholesteremia  Age-related osteoporosis without current pathological fracture  Levator spasm  Pseudophakia of right eye  Seasonal allergic rhinitis, unspecified trigger  Mild intermittent reactive airway disease without  complication

## 2024-02-29 ENCOUNTER — APPOINTMENT (OUTPATIENT)
Dept: URBAN - METROPOLITAN AREA CLINIC 204 | Age: 79
Setting detail: DERMATOLOGY
End: 2024-02-29

## 2024-02-29 PROCEDURE — 99213 OFFICE O/P EST LOW 20 MIN: CPT | Mod: 25

## 2024-02-29 PROCEDURE — 17000 DESTRUCT PREMALG LESION: CPT

## 2024-02-29 PROCEDURE — 17003 DESTRUCT PREMALG LES 2-14: CPT

## 2024-02-29 PROCEDURE — OTHER MIPS QUALITY: OTHER

## 2024-02-29 NOTE — PROCEDURE: MIPS QUALITY
Additional Notes: Documentation for MIPS  purposes only. Full Patient visit note from paper chart is available for review and also scanned in EMA chart.
Quality 130: Documentation Of Current Medications In The Medical Record: Current Medications Documented
Quality 226: Preventive Care And Screening: Tobacco Use: Screening And Cessation Intervention: Patient screened for tobacco use and is an ex/non-smoker
Quality 47: Advance Care Plan: Advance Care Planning discussed and documented; advance care plan or surrogate decision maker documented in the medical record.
Detail Level: Detailed

## 2024-03-07 ENCOUNTER — OFFICE VISIT (OUTPATIENT)
Dept: UROLOGY | Facility: CLINIC | Age: 79
End: 2024-03-07
Payer: MEDICARE

## 2024-03-07 VITALS — BODY MASS INDEX: 22.05 KG/M2 | HEIGHT: 70 IN | WEIGHT: 154 LBS | RESPIRATION RATE: 16 BRPM

## 2024-03-07 DIAGNOSIS — R33.9 RETENTION OF URINE: ICD-10-CM

## 2024-03-07 DIAGNOSIS — R30.0 DYSURIA: ICD-10-CM

## 2024-03-07 DIAGNOSIS — N40.0 BENIGN PROSTATIC HYPERPLASIA, UNSPECIFIED WHETHER LOWER URINARY TRACT SYMPTOMS PRESENT: ICD-10-CM

## 2024-03-07 DIAGNOSIS — R35.1 NOCTURIA: ICD-10-CM

## 2024-03-07 LAB
POC APPEARANCE, URINE: CLEAR
POC BILIRUBIN, URINE: NEGATIVE
POC BLOOD, URINE: NEGATIVE
POC COLOR, URINE: YELLOW
POC GLUCOSE, URINE: NEGATIVE MG/DL
POC KETONES, URINE: NEGATIVE MG/DL
POC LEUKOCYTES, URINE: NEGATIVE
POC NITRITE,URINE: NEGATIVE
POC PH, URINE: 5.5 PH
POC PROTEIN, URINE: NEGATIVE MG/DL
POC SPECIFIC GRAVITY, URINE: >=1.03
POC UROBILINOGEN, URINE: 0.2 EU/DL

## 2024-03-07 PROCEDURE — 1036F TOBACCO NON-USER: CPT | Performed by: UROLOGY

## 2024-03-07 PROCEDURE — 81003 URINALYSIS AUTO W/O SCOPE: CPT | Performed by: UROLOGY

## 2024-03-07 PROCEDURE — 99214 OFFICE O/P EST MOD 30 MIN: CPT | Performed by: UROLOGY

## 2024-03-07 PROCEDURE — 1159F MED LIST DOCD IN RCRD: CPT | Performed by: UROLOGY

## 2024-03-07 RX ORDER — CATHETER 14 FR
EACH MISCELLANEOUS
Qty: 30 EACH | Refills: 2 | Status: SHIPPED | OUTPATIENT
Start: 2024-03-07

## 2024-03-07 RX ORDER — URINARY ANTISEPTIC ANTISPASMODIC 81.6; 40.8; 10.8; .12 MG/1; MG/1; MG/1; MG/1
TABLET ORAL
Qty: 30 TABLET | Refills: 1 | Status: SHIPPED | OUTPATIENT
Start: 2024-03-07

## 2024-03-07 ASSESSMENT — ENCOUNTER SYMPTOMS
EYES NEGATIVE: 1
DIFFICULTY URINATING: 0
FEVER: 0
NAUSEA: 0
COUGH: 0
PSYCHIATRIC NEGATIVE: 1
ALLERGIC/IMMUNOLOGIC NEGATIVE: 1
SHORTNESS OF BREATH: 0
CHILLS: 0
ENDOCRINE NEGATIVE: 1

## 2024-03-07 NOTE — PROGRESS NOTES
"Subjective   Patient ID: Gomez Abreu is a 78 y.o. male.    HPI  Patient is here for UTI sx. He states he is having dysuria and weak stream. Dysuria started a month ago. Denies hematuria. He is taking Flomax...Hx of urinary retention. He knows [facility] and has done this a few times. He states PVR was \"not a whole lot\"  Most recent PSA was 1.58 on 12/23. Prior PSA was 1.34 on 11/22. Prior PSA was 0.94 on 5/22.  ED is chronic. No medication for this. He is taking Isosorbide.         Review of Systems   Constitutional:  Negative for chills and fever.   HENT: Negative.     Eyes: Negative.    Respiratory:  Negative for cough and shortness of breath.    Cardiovascular:  Negative for chest pain and leg swelling.   Gastrointestinal:  Negative for nausea.   Endocrine: Negative.    Genitourinary:  Negative for difficulty urinating.        Negative except for documented in HPI   Allergic/Immunologic: Negative.    Neurological:         Alert & oriented X 3   Hematological:         Denies blood thinners   Psychiatric/Behavioral: Negative.         Objective   Physical Exam  Vitals and nursing note reviewed.   Constitutional:       General: He is not in acute distress.     Appearance: Normal appearance.   Pulmonary:      Effort: Pulmonary effort is normal.   Abdominal:      Tenderness: There is no abdominal tenderness.   Genitourinary:     Comments: Kidneys non palpable bilaterally  Bladder non palpable or tender  Scrotum no mass, No hydrocele  Epididymis- No spermatocele. Non Tender.  Testicles: No mass. Symmetric  Urethra: No discharge  Penis within normal limits... No lesions. circumcised  Prostate - symmetric, no nodules. BENIGN  Seminal Vesicals: No mass.  Sphincter tone: normal  Neurological:      Mental Status: He is alert.         Assessment/Plan   Diagnoses and all orders for this visit:  Benign prostatic hyperplasia, unspecified whether lower urinary tract symptoms present  Retention of urine  Nocturia  -     POCT UA " Automated manually resulted  Dysuria      All available PSA values reviewed, Options discussed. Questions answered.   Diet changes for prostate health discussed and educational information given. Pros/Cons of prostate health supplements discussed.   Treatment options for LUTS reviewed  [facility] Rx given  Urogesic Rx given  Continue Flomax  UroLift discussed  Discussed timed voiding. Discussed fluid and caffeine intake  Treatment options for ED reviewed.  Lifestyle change to help prevent UTIs discussed. Encouraged fluid intake.    F/U  1 month with PVR

## 2024-03-22 ASSESSMENT — ENCOUNTER SYMPTOMS
FEVER: 0
COUGH: 0
CHILLS: 0
ENDOCRINE NEGATIVE: 1
DIFFICULTY URINATING: 0
PSYCHIATRIC NEGATIVE: 1
EYES NEGATIVE: 1
ALLERGIC/IMMUNOLOGIC NEGATIVE: 1
NAUSEA: 0
SHORTNESS OF BREATH: 0

## 2024-03-22 NOTE — PROGRESS NOTES
Subjective   Patient ID: Gomez Abreu is a 78 y.o. male.    HPI  Patient is here for 1 month follow up. He was given Urogesic blue last visit and states this was too expensive. He is taking Azo PRN.  He is taking Flomax which has been helpful. Hx of urinary retention. He knows [facility].  Most recent PSA was 1.58 on 12/23. Prior PSA was 1.34 on 11/22. Prior PSA was 0.94 on 5/22.  ED is chronic. No medication for this. He is taking Isosorbide.     PVR was minimal (4ml)      Review of Systems   Constitutional:  Negative for chills and fever.   HENT: Negative.     Eyes: Negative.    Respiratory:  Negative for cough and shortness of breath.    Cardiovascular:  Negative for chest pain and leg swelling.   Gastrointestinal:  Negative for nausea.   Endocrine: Negative.    Genitourinary:  Negative for difficulty urinating.        Negative except for documented in HPI   Allergic/Immunologic: Negative.    Neurological:         Alert & oriented X 3   Hematological:         Denies blood thinners   Psychiatric/Behavioral: Negative.         Objective   Physical Exam  Vitals and nursing note reviewed.   Pulmonary:      Effort: Pulmonary effort is normal.      Breath sounds: Normal breath sounds.   Abdominal:      Palpations: Abdomen is soft.      Tenderness: There is no abdominal tenderness.   Genitourinary:     Comments: Kidneys non palpable bilaterally  Bladder non palpable or tender  Neurological:      Mental Status: He is alert.         Assessment/Plan       Diagnoses and all orders for this visit:  Benign prostatic hyperplasia, unspecified whether lower urinary tract symptoms present  Retention of urine  Nocturia      All available PSA values reviewed, Options discussed. Questions answered.   Diet changes for prostate health discussed and educational information given. Pros/Cons of prostate health supplements discussed.   Treatment options for LUTS reviewed  Pyridium PRN  Continue Flomax  [facility] Rx given for QHS  Discussed timed  voiding. Discussed fluid and caffeine intake  Treatment options for ED reviewed.  Lifestyle change to help prevent UTIs discussed. Encouraged fluid intake.    F/U  12/24 with PSA

## 2024-03-25 ENCOUNTER — OFFICE VISIT (OUTPATIENT)
Dept: UROLOGY | Facility: CLINIC | Age: 79
End: 2024-03-25
Payer: MEDICARE

## 2024-03-25 VITALS — BODY MASS INDEX: 22.24 KG/M2 | RESPIRATION RATE: 16 BRPM | WEIGHT: 155 LBS

## 2024-03-25 DIAGNOSIS — N40.0 BENIGN PROSTATIC HYPERPLASIA, UNSPECIFIED WHETHER LOWER URINARY TRACT SYMPTOMS PRESENT: ICD-10-CM

## 2024-03-25 DIAGNOSIS — R35.1 NOCTURIA: ICD-10-CM

## 2024-03-25 PROCEDURE — 1159F MED LIST DOCD IN RCRD: CPT | Performed by: UROLOGY

## 2024-03-25 PROCEDURE — 99214 OFFICE O/P EST MOD 30 MIN: CPT | Performed by: UROLOGY

## 2024-03-25 PROCEDURE — 1036F TOBACCO NON-USER: CPT | Performed by: UROLOGY

## 2024-03-25 PROCEDURE — 51798 US URINE CAPACITY MEASURE: CPT | Performed by: UROLOGY

## 2024-06-10 ENCOUNTER — LAB (OUTPATIENT)
Dept: LAB | Facility: LAB | Age: 79
End: 2024-06-10
Payer: MEDICARE

## 2024-06-10 DIAGNOSIS — I10 ESSENTIAL HYPERTENSION: ICD-10-CM

## 2024-06-10 LAB
ALBUMIN SERPL BCP-MCNC: 4.2 G/DL (ref 3.4–5)
ALP SERPL-CCNC: 31 U/L (ref 33–136)
ALT SERPL W P-5'-P-CCNC: 19 U/L (ref 10–52)
ANION GAP SERPL CALC-SCNC: 8 MMOL/L (ref 10–20)
AST SERPL W P-5'-P-CCNC: 23 U/L (ref 9–39)
BILIRUB SERPL-MCNC: 0.4 MG/DL (ref 0–1.2)
BUN SERPL-MCNC: 19 MG/DL (ref 6–23)
CALCIUM SERPL-MCNC: 9.1 MG/DL (ref 8.6–10.3)
CHLORIDE SERPL-SCNC: 106 MMOL/L (ref 98–107)
CO2 SERPL-SCNC: 31 MMOL/L (ref 21–32)
CREAT SERPL-MCNC: 1.1 MG/DL (ref 0.5–1.3)
EGFRCR SERPLBLD CKD-EPI 2021: 69 ML/MIN/1.73M*2
ERYTHROCYTE [DISTWIDTH] IN BLOOD BY AUTOMATED COUNT: 13.1 % (ref 11.5–14.5)
GLUCOSE SERPL-MCNC: 88 MG/DL (ref 74–99)
HCT VFR BLD AUTO: 45.4 % (ref 41–52)
HGB BLD-MCNC: 14.6 G/DL (ref 13.5–17.5)
MCH RBC QN AUTO: 30.8 PG (ref 26–34)
MCHC RBC AUTO-ENTMCNC: 32.2 G/DL (ref 32–36)
MCV RBC AUTO: 96 FL (ref 80–100)
NRBC BLD-RTO: 0 /100 WBCS (ref 0–0)
PLATELET # BLD AUTO: 237 X10*3/UL (ref 150–450)
POTASSIUM SERPL-SCNC: 3.9 MMOL/L (ref 3.5–5.3)
PROT SERPL-MCNC: 6.1 G/DL (ref 6.4–8.2)
RBC # BLD AUTO: 4.74 X10*6/UL (ref 4.5–5.9)
SODIUM SERPL-SCNC: 141 MMOL/L (ref 136–145)
WBC # BLD AUTO: 5.3 X10*3/UL (ref 4.4–11.3)

## 2024-06-10 PROCEDURE — 80053 COMPREHEN METABOLIC PANEL: CPT

## 2024-06-10 PROCEDURE — 36415 COLL VENOUS BLD VENIPUNCTURE: CPT

## 2024-06-10 PROCEDURE — 85027 COMPLETE CBC AUTOMATED: CPT

## 2024-06-12 DIAGNOSIS — N40.0 BENIGN PROSTATIC HYPERPLASIA, UNSPECIFIED WHETHER LOWER URINARY TRACT SYMPTOMS PRESENT: ICD-10-CM

## 2024-06-12 RX ORDER — TAMSULOSIN HYDROCHLORIDE 0.4 MG/1
0.4 CAPSULE ORAL DAILY
Qty: 30 CAPSULE | Refills: 11 | Status: SHIPPED | OUTPATIENT
Start: 2024-06-12 | End: 2024-07-12

## 2024-06-14 ENCOUNTER — APPOINTMENT (OUTPATIENT)
Dept: PRIMARY CARE | Facility: CLINIC | Age: 79
End: 2024-06-14
Payer: MEDICARE

## 2024-06-14 VITALS
DIASTOLIC BLOOD PRESSURE: 54 MMHG | WEIGHT: 153 LBS | HEART RATE: 54 BPM | SYSTOLIC BLOOD PRESSURE: 120 MMHG | OXYGEN SATURATION: 97 % | BODY MASS INDEX: 21.9 KG/M2 | HEIGHT: 70 IN

## 2024-06-14 DIAGNOSIS — N40.1 BENIGN PROSTATIC HYPERPLASIA WITH URINARY OBSTRUCTION AND OTHER LOWER URINARY TRACT SYMPTOMS: ICD-10-CM

## 2024-06-14 DIAGNOSIS — J45.20 MILD INTERMITTENT REACTIVE AIRWAY DISEASE WITHOUT COMPLICATION (HHS-HCC): ICD-10-CM

## 2024-06-14 DIAGNOSIS — G25.0 ESSENTIAL TREMOR: ICD-10-CM

## 2024-06-14 DIAGNOSIS — M62.838 LEVATOR SPASM: ICD-10-CM

## 2024-06-14 DIAGNOSIS — M47.812 CERVICAL SPONDYLOSIS: ICD-10-CM

## 2024-06-14 DIAGNOSIS — E78.00 HYPERCHOLESTEREMIA: ICD-10-CM

## 2024-06-14 DIAGNOSIS — N13.8 BENIGN PROSTATIC HYPERPLASIA WITH URINARY OBSTRUCTION AND OTHER LOWER URINARY TRACT SYMPTOMS: ICD-10-CM

## 2024-06-14 DIAGNOSIS — J30.2 SEASONAL ALLERGIC RHINITIS, UNSPECIFIED TRIGGER: ICD-10-CM

## 2024-06-14 DIAGNOSIS — R41.3 AGE-RELATED MEMORY DISORDER: ICD-10-CM

## 2024-06-14 DIAGNOSIS — M81.0 AGE-RELATED OSTEOPOROSIS WITHOUT CURRENT PATHOLOGICAL FRACTURE: ICD-10-CM

## 2024-06-14 DIAGNOSIS — Z00.00 ROUTINE GENERAL MEDICAL EXAMINATION AT HEALTH CARE FACILITY: Primary | ICD-10-CM

## 2024-06-14 DIAGNOSIS — M15.9 PRIMARY OSTEOARTHRITIS INVOLVING MULTIPLE JOINTS: ICD-10-CM

## 2024-06-14 DIAGNOSIS — I10 ESSENTIAL HYPERTENSION: ICD-10-CM

## 2024-06-14 DIAGNOSIS — Z96.1 PSEUDOPHAKIA OF RIGHT EYE: ICD-10-CM

## 2024-06-14 DIAGNOSIS — H25.812 COMBINED FORMS OF AGE-RELATED CATARACT OF LEFT EYE: ICD-10-CM

## 2024-06-14 DIAGNOSIS — M43.06 LUMBAR SPONDYLOLYSIS: ICD-10-CM

## 2024-06-14 DIAGNOSIS — K21.9 GASTROESOPHAGEAL REFLUX DISEASE WITHOUT ESOPHAGITIS: ICD-10-CM

## 2024-06-14 DIAGNOSIS — E55.9 VITAMIN D DEFICIENCY: ICD-10-CM

## 2024-06-14 PROBLEM — H35.379 EPIRETINAL MEMBRANE: Status: RESOLVED | Noted: 2023-11-16 | Resolved: 2024-06-14

## 2024-06-14 PROCEDURE — 1170F FXNL STATUS ASSESSED: CPT | Performed by: FAMILY MEDICINE

## 2024-06-14 PROCEDURE — 3074F SYST BP LT 130 MM HG: CPT | Performed by: FAMILY MEDICINE

## 2024-06-14 PROCEDURE — 1159F MED LIST DOCD IN RCRD: CPT | Performed by: FAMILY MEDICINE

## 2024-06-14 PROCEDURE — 3078F DIAST BP <80 MM HG: CPT | Performed by: FAMILY MEDICINE

## 2024-06-14 PROCEDURE — 1160F RVW MEDS BY RX/DR IN RCRD: CPT | Performed by: FAMILY MEDICINE

## 2024-06-14 PROCEDURE — 1158F ADVNC CARE PLAN TLK DOCD: CPT | Performed by: FAMILY MEDICINE

## 2024-06-14 PROCEDURE — 1123F ACP DISCUSS/DSCN MKR DOCD: CPT | Performed by: FAMILY MEDICINE

## 2024-06-14 PROCEDURE — G0439 PPPS, SUBSEQ VISIT: HCPCS | Performed by: FAMILY MEDICINE

## 2024-06-14 PROCEDURE — 1036F TOBACCO NON-USER: CPT | Performed by: FAMILY MEDICINE

## 2024-06-14 ASSESSMENT — ACTIVITIES OF DAILY LIVING (ADL)
MANAGING_FINANCES: INDEPENDENT
DRESSING: INDEPENDENT
DOING_HOUSEWORK: INDEPENDENT
GROCERY_SHOPPING: INDEPENDENT
BATHING: INDEPENDENT
TAKING_MEDICATION: INDEPENDENT

## 2024-06-14 ASSESSMENT — PATIENT HEALTH QUESTIONNAIRE - PHQ9
1. LITTLE INTEREST OR PLEASURE IN DOING THINGS: NOT AT ALL
SUM OF ALL RESPONSES TO PHQ9 QUESTIONS 1 AND 2: 0
2. FEELING DOWN, DEPRESSED OR HOPELESS: NOT AT ALL

## 2024-06-14 NOTE — PATIENT INSTRUCTIONS
You can start skipping days of the isosorbide and then if no problem stop it   After a few weeks you can do the same with the Amlodipine  Monitor Blood pressure.

## 2024-06-14 NOTE — PROGRESS NOTES
Subjective   Reason for Visit: Gomez Abreu is an 78 y.o. male here for a Medicare Wellness visit.     Past Medical, Surgical, and Family History reviewed and updated in chart.    Reviewed all medications by prescribing practitioner or clinical pharmacist (such as prescriptions, OTCs, herbal therapies and supplements) and documented in the medical record.    HPI  Recurrent sinusitis - stuffy and dry most of the time. Vaseline does help.  No recent infections.      Pain in the elbows and wrists.  Elbows fine. Wrists not so good. Injection not helpful Has had PT and probably surgery. Pantrapezial arthritis.  Dr Lawson.  No swollen.  Hard to turn doorknob. GFR is good.     Memory is stable. Word games. Dementia runs in family.     BPH nocturia. PSA 1.58 last time. Slow stream when on a cruise for some reason. . Dr Anguiano. Home on catheter as needed. Uses at hs sometimes. Tamsulosin helps.  Also was on Alfuzosin.     Hyperlipidemia better on Zetia.  222 down to 187. Last time      HTN - No Dyspnea, palpitations, numbness, weakness, claudications, or double vision/ loss of vision. Chest pain daily with joints unchanged. Wild lettuce. Did have stress test for this.  Also CTA cardiac. Isosorbide has not helped. Can skip that if desired and watch for increased BP or more pain. Can monitor BP. If off of the isosorbide and doing well can do the same with Amlodopine. Would rather not have chemicals in him he does not need     Cataracts - slowly worse on left. Pseudophakia good on right     Essential tremor mild and not progression    GERD -on Pepcid daily discontinued a few weeks ago. So far has had no HB, melaea or blood or dysphagia.      Levator spasm none since a year ago.     Osteoporosis - Calcium and D and pretty good in May, 2023 .  Off Fosamax     Seasonal allergies and asthma - good now so cancelled with pulmonologist.     Cervical and lumbar spondylosis bothersome in neck only     LW and DPA - yes wife and  "marcial King 5/30/23   Pneumovax UTD     Patient Care Team:  Maikel Ramon MD as PCP - General  Maikel Ramon MD as PCP - [facility] Attributed Provider     Review of Systems    Objective   Vitals:  /54 (BP Location: Right arm, Patient Position: Sitting)   Pulse 54   Ht 1.778 m (5' 10\")   Wt 69.4 kg (153 lb)   SpO2 97%   BMI 21.95 kg/m²       Physical Exam  Vitals reviewed.   Constitutional:       General: He is not in acute distress.     Appearance: Normal appearance.   HENT:      Head: Normocephalic.      Right Ear: Tympanic membrane normal.      Left Ear: Tympanic membrane normal.      Nose: Nose normal.      Mouth/Throat:      Pharynx: Oropharynx is clear.   Eyes:      Extraocular Movements: Extraocular movements intact.      Conjunctiva/sclera: Conjunctivae normal.      Pupils: Pupils are equal, round, and reactive to light.   Neck:      Vascular: No carotid bruit.   Cardiovascular:      Rate and Rhythm: Normal rate and regular rhythm.      Pulses: Normal pulses.      Heart sounds: Normal heart sounds. No murmur heard.  Pulmonary:      Effort: Pulmonary effort is normal. No respiratory distress.      Breath sounds: Normal breath sounds.   Abdominal:      General: Abdomen is flat. Bowel sounds are normal. There is no distension.      Palpations: Abdomen is soft. There is no mass.      Tenderness: There is no abdominal tenderness.   Musculoskeletal:         General: Tenderness (left wrist) present.      Cervical back: Normal range of motion and neck supple. No tenderness.   Lymphadenopathy:      Cervical: No cervical adenopathy.   Skin:     General: Skin is warm and dry.      Findings: No rash.   Neurological:      General: No focal deficit present.      Mental Status: He is alert and oriented to person, place, and time.   Psychiatric:         Mood and Affect: Mood normal.         Thought Content: Thought content normal.         Judgment: Judgment normal.         Assessment/Plan   Problem " List Items Addressed This Visit       Age-related memory disorder    Benign prostatic hyperplasia with urinary obstruction and other lower urinary tract symptoms    Relevant Orders    Prostate Specific Antigen    Cervical spondylosis    Combined forms of age-related cataract of left eye    Essential hypertension - Primary    Relevant Orders    CBC    Comprehensive Metabolic Panel    Follow Up In Primary Care - Established    Essential tremor    GERD (gastroesophageal reflux disease)    Hypercholesteremia    Relevant Orders    Lipid Panel    Levator spasm    Lumbar spondylolysis    Osteoarthritis    Osteoporosis    Pseudophakia of right eye    Reactive airway disease ([facility])    Seasonal allergic rhinitis     Other Visit Diagnoses       Vitamin D deficiency        Relevant Orders    Vitamin D 25-Hydroxy,Total (for eval of Vitamin D levels)

## 2024-08-14 ENCOUNTER — APPOINTMENT (OUTPATIENT)
Dept: URBAN - METROPOLITAN AREA CLINIC 204 | Age: 79
Setting detail: DERMATOLOGY
End: 2024-08-15

## 2024-08-14 PROCEDURE — OTHER MIPS QUALITY: OTHER

## 2024-08-14 PROCEDURE — 99213 OFFICE O/P EST LOW 20 MIN: CPT

## 2024-09-10 DIAGNOSIS — N40.0 BENIGN PROSTATIC HYPERPLASIA, UNSPECIFIED WHETHER LOWER URINARY TRACT SYMPTOMS PRESENT: Primary | ICD-10-CM

## 2024-09-11 RX ORDER — TAMSULOSIN HYDROCHLORIDE 0.4 MG/1
0.4 CAPSULE ORAL DAILY
Qty: 90 CAPSULE | Refills: 3 | Status: SHIPPED | OUTPATIENT
Start: 2024-09-11 | End: 2025-09-11

## 2024-09-27 ENCOUNTER — OFFICE VISIT (OUTPATIENT)
Dept: PRIMARY CARE | Facility: CLINIC | Age: 79
End: 2024-09-27
Payer: MEDICARE

## 2024-09-27 VITALS
SYSTOLIC BLOOD PRESSURE: 122 MMHG | BODY MASS INDEX: 21.67 KG/M2 | DIASTOLIC BLOOD PRESSURE: 64 MMHG | OXYGEN SATURATION: 98 % | HEART RATE: 66 BPM | WEIGHT: 151 LBS

## 2024-09-27 DIAGNOSIS — M47.816 FACET ARTHROPATHY, LUMBAR: Primary | ICD-10-CM

## 2024-09-27 PROCEDURE — 1123F ACP DISCUSS/DSCN MKR DOCD: CPT | Performed by: FAMILY MEDICINE

## 2024-09-27 PROCEDURE — 99213 OFFICE O/P EST LOW 20 MIN: CPT | Performed by: FAMILY MEDICINE

## 2024-09-27 PROCEDURE — 3078F DIAST BP <80 MM HG: CPT | Performed by: FAMILY MEDICINE

## 2024-09-27 PROCEDURE — 1036F TOBACCO NON-USER: CPT | Performed by: FAMILY MEDICINE

## 2024-09-27 PROCEDURE — 3074F SYST BP LT 130 MM HG: CPT | Performed by: FAMILY MEDICINE

## 2024-09-27 PROCEDURE — 1158F ADVNC CARE PLAN TLK DOCD: CPT | Performed by: FAMILY MEDICINE

## 2024-09-27 PROCEDURE — 1159F MED LIST DOCD IN RCRD: CPT | Performed by: FAMILY MEDICINE

## 2024-09-27 RX ORDER — PREDNISONE 20 MG/1
TABLET ORAL
Qty: 18 TABLET | Refills: 0 | Status: SHIPPED | OUTPATIENT
Start: 2024-09-27

## 2024-09-27 ASSESSMENT — ENCOUNTER SYMPTOMS: BACK PAIN: 1

## 2024-09-27 NOTE — PROGRESS NOTES
Subjective   Patient ID: Gomez Abreu is a 78 y.o. male who presents for Back Pain (X3 weeks).    Back Pain       Low Back pain for 3 weeks  Did some therapy the last time a couple years ago and had a lift in a shoe that helped till now  Did clean carpets. And since then has been walking in stocking feet in house with no lift   Tried again with shoes and no better  This feels like pain he had off and on prior to above therapy  Pain in low right back to the right leg when driving long distances  So had to get out to walk  Did sleep in a soft bed. Hard to get to stand from sitting  Better with standing  No numbness or weakness.     Has been on Norco for the wrist. Not helping the back.   So will try prednisone for inflammation     LW And DPA - yes wife and daughter     Review of Systems   Musculoskeletal:  Positive for back pain.       Objective   /64 (BP Location: Right arm, Patient Position: Sitting)   Pulse 66   Wt 68.5 kg (151 lb)   SpO2 98%   BMI 21.67 kg/m²     Physical Exam  Constitutional:       Appearance: Normal appearance.   Abdominal:      General: Abdomen is flat. Bowel sounds are normal. There is no distension.      Palpations: Abdomen is soft.      Tenderness: There is no abdominal tenderness. There is no right CVA tenderness or left CVA tenderness.   Musculoskeletal:         General: Tenderness (right paralumbar.) present. No swelling or deformity.      Comments: SLR WNL  Pain in mid back with extension and rotation both ways on the right     Right wrist with decreased ROM after a surgery on the thumb.       Neurological:      Mental Status: He is alert.         Assessment/Plan   Diagnoses and all orders for this visit:  Facet arthropathy, lumbar  -     predniSONE (Deltasone) 20 mg tablet; Take 3 tabs (60mg) daily for 3 days, then take 2 tabs (40mg) daily for 3 days, then take 1 tab (20mg) daily for 3 days.  LS Xray if no better and consider pain management or PT

## 2024-10-07 ENCOUNTER — TELEPHONE (OUTPATIENT)
Dept: PRIMARY CARE | Facility: CLINIC | Age: 79
End: 2024-10-07
Payer: MEDICARE

## 2024-10-07 DIAGNOSIS — M47.816 FACET ARTHROPATHY, LUMBAR: ICD-10-CM

## 2024-10-07 RX ORDER — PREDNISONE 20 MG/1
TABLET ORAL
Qty: 18 TABLET | Refills: 0 | Status: SHIPPED | OUTPATIENT
Start: 2024-10-07

## 2024-10-07 NOTE — TELEPHONE ENCOUNTER
Patient states he finished his script for Prednisone for the back pain, he is completely finished & now his back pain has returned with the same strength it was. Please call to discuss

## 2024-10-09 ENCOUNTER — HOSPITAL ENCOUNTER (OUTPATIENT)
Dept: RADIOLOGY | Facility: CLINIC | Age: 79
Discharge: HOME | End: 2024-10-09
Payer: MEDICARE

## 2024-10-09 DIAGNOSIS — M47.816 FACET ARTHROPATHY, LUMBAR: ICD-10-CM

## 2024-10-09 PROCEDURE — 72100 X-RAY EXAM L-S SPINE 2/3 VWS: CPT

## 2024-10-09 PROCEDURE — 72100 X-RAY EXAM L-S SPINE 2/3 VWS: CPT | Performed by: RADIOLOGY

## 2024-10-23 ENCOUNTER — OFFICE VISIT (OUTPATIENT)
Dept: PAIN MEDICINE | Facility: CLINIC | Age: 79
End: 2024-10-23
Payer: MEDICARE

## 2024-10-23 VITALS
RESPIRATION RATE: 16 BRPM | SYSTOLIC BLOOD PRESSURE: 121 MMHG | WEIGHT: 154 LBS | HEIGHT: 69 IN | DIASTOLIC BLOOD PRESSURE: 63 MMHG | HEART RATE: 74 BPM | BODY MASS INDEX: 22.81 KG/M2

## 2024-10-23 DIAGNOSIS — M79.18 MYOFASCIAL PAIN: ICD-10-CM

## 2024-10-23 DIAGNOSIS — M47.812 CERVICAL ARTHRITIS: Primary | ICD-10-CM

## 2024-10-23 DIAGNOSIS — M47.816 FACET ARTHROPATHY, LUMBAR: ICD-10-CM

## 2024-10-23 PROCEDURE — 99214 OFFICE O/P EST MOD 30 MIN: CPT | Performed by: STUDENT IN AN ORGANIZED HEALTH CARE EDUCATION/TRAINING PROGRAM

## 2024-10-23 ASSESSMENT — COLUMBIA-SUICIDE SEVERITY RATING SCALE - C-SSRS
1. IN THE PAST MONTH, HAVE YOU WISHED YOU WERE DEAD OR WISHED YOU COULD GO TO SLEEP AND NOT WAKE UP?: NO
2. HAVE YOU ACTUALLY HAD ANY THOUGHTS OF KILLING YOURSELF?: NO
6. HAVE YOU EVER DONE ANYTHING, STARTED TO DO ANYTHING, OR PREPARED TO DO ANYTHING TO END YOUR LIFE?: NO

## 2024-10-23 ASSESSMENT — PAIN SCALES - GENERAL: PAINLEVEL_OUTOF10: 2

## 2024-10-23 ASSESSMENT — PATIENT HEALTH QUESTIONNAIRE - PHQ9
1. LITTLE INTEREST OR PLEASURE IN DOING THINGS: NOT AT ALL
2. FEELING DOWN, DEPRESSED OR HOPELESS: NOT AT ALL
SUM OF ALL RESPONSES TO PHQ9 QUESTIONS 1 AND 2: 0

## 2024-10-23 NOTE — PROGRESS NOTES
Subjective   Patient ID: Gomez Abreu is a 78 y.o. male who presents for Back Pain (Patient complains of right lower back pain.  Patient states he's only had radiation down his leg after a car ride from [place].  Otherwise pain does not radiate.  Patient states he saw Dr. Salgado years ago.  It was discovered at that time at PT that his left leg was shorted than his right.  After getting a lift for his left shoe the pain resolved.  Patient states since then about 5 months ago that pain has returned.  Patient denied numbness and tingling.  Rates pain a 2/10 now and a 8/10 at worst.).  Patient states he's had 2 recent rounds of prednisone.  After the first his pain returned immediately.  After the 2nd round the pain returned much milder.    YASH score 24.  SOAPP 0.  Smoking screen completed, negative.  Falls screen completed, negative.  Depression screen completed, negative.  HPI  Patient has had a longstanding history of low back pain as well as cervical pain.  This pain is nonradiating in nature and describes a dull ache that can be sharp/stabbing in nature when acutely exacerbated.  This is worse with twisting turning and bending and associated with morning stiffness.  We did review his lumbar and cervical imaging and discussed that he has multilevel degenerative changes at each area.  He rates his pain typically is a 2/10 but an 8/10 at its worst.  He is very hesitant to consider medications and does not tolerate NSAIDs very well.  He has tried NSAIDs over the years and they have not been significantly helpful inclusive of meloxicam as well as ibuprofen.  Acetaminophen provides minimal relief, a short burst of prednisone x 2 provided some relief but incomplete.  He does do exercises on a regular basis but has not done formal physical therapy in some time we discussed a referral to formal physical therapy.  Review of Systems   All other systems reviewed and are negative.      Objective   Physical  Exam  Constitutional: No acute distress, well appearing and well nourished. Patient appears stated age.  Eyes: Conjunctiva non-icteric and eye lids are without obvious rash or drooping. Pupils are symmetric.  Ears, Nose, Mouth, and Throat: External ears and nose appear to be without deformity or rash. No lesions or masses noted.  Hearing is grossly intact.  Neck: No JVD noted, tracheal position is midline.  Head and Face: Examination of the head and face revealed no abnormalities.  Respiratory: No gasping or shortness of breath noted, no use of accessory muscles noted.  Cardiovascular: Examination for edema is normal.   GI: Abdomen nontender to palpation.  Skin: No rashes or open lesions/ulcers identified on examined areas.    MSK:   No asymmetry or masses noted of the musculature.   Examination of the muscles/joints/bones show grossly normal range of motion unless noted below.    Neurologic:  Motor strength:   5/5 muscle strength of the upper extremities bilateral and equal.  5/5 muscle strength of the lower extremities bilaterally and equal.     Sensation:   Sensation intact to light touch in the bilateral upper and lower extremities.    Provocative tests: Negative Abad sign bilaterally, seated straight leg raise test not reproduce radicular signs bilaterally.  Mild tenderness palpation the cervical and lumbar paraspinal musculature with cervical and lumbar facet loading reproducing axial pain bilaterally.    Psychiatric: Mood and affect are normal.    Assessment/Plan   Diagnoses and all orders for this visit:  Cervical arthritis  -     Referral to Physical Therapy; Future  Facet arthropathy, lumbar  -     Referral to Pain Medicine  -     Referral to Physical Therapy; Future  Myofascial pain  -     Referral to Physical Therapy; Future  The patient´s history, physical exam and personal review of imaging indicate diagnoses of the above items.    Plan description:  -Referral to formal physical therapy for neck  and low back  -Follow-up in 2 months or sooner if any issues arise  -If his myofascial pain persists or worsens we can consider the addition of baclofen 5 mg 3 times a day as needed  -We discussed a plan to consider cervical and lumbar medial branch blocks.  We would plan to perform cervical medial branch blocks to target the C3/4 and C4/5 facet joints under fluoroscopic guidance in anticipation of radiofrequency ablation if effective  -For the lumbar spine we will plan to perform lumbar medial branch blocks target the L4/5 and L5/S1 facet joints under fluoroscopic guidance in anticipation of radiofrequency ablation if effective        Counseling:  The patient was counseled regarding diagnostic results, instructions for management, risk factor reductions, prognosis, patient and family education, impressions, risk and benefits of treatment options, as well as adherence to current treatment regimen. The importance of physical therapy/core strengthening was discussed in regard to an appropriate level for the patient to participate in currently, as well as progress as able. Nicotine and alcohol use were reviewed and discussed as appropriate in relation to cessation and abstinence as indiciated, time spent for smoking cessation counseling when appropriate is 3-10 minutes. Appropriate use of opioid medications if prescribed as well as adherance and compliance to routine screening and avoidance of any medication that causes side effects in combination such as benzodiazepines. OARRS reviewed when indicated and naloxone offered if opioid medication prescribed.    All questions and concerns were addressed during clinical visit. Patient verbalized understanding and agreement with the current plan and counselling. The patient was invited to contact us back anytime with any questions or concerns and follow-up with us in the future.           Yulissa Phelps RN 10/23/24 9:10 AM

## 2024-12-09 ENCOUNTER — LAB (OUTPATIENT)
Dept: LAB | Facility: LAB | Age: 79
End: 2024-12-09
Payer: MEDICARE

## 2024-12-09 DIAGNOSIS — I10 ESSENTIAL HYPERTENSION: ICD-10-CM

## 2024-12-09 DIAGNOSIS — N13.8 BENIGN PROSTATIC HYPERPLASIA WITH URINARY OBSTRUCTION AND OTHER LOWER URINARY TRACT SYMPTOMS: ICD-10-CM

## 2024-12-09 DIAGNOSIS — N40.1 BENIGN PROSTATIC HYPERPLASIA WITH URINARY OBSTRUCTION AND OTHER LOWER URINARY TRACT SYMPTOMS: ICD-10-CM

## 2024-12-09 DIAGNOSIS — E78.00 HYPERCHOLESTEREMIA: ICD-10-CM

## 2024-12-09 DIAGNOSIS — R35.1 NOCTURIA: ICD-10-CM

## 2024-12-09 DIAGNOSIS — E55.9 VITAMIN D DEFICIENCY: ICD-10-CM

## 2024-12-09 LAB
25(OH)D3 SERPL-MCNC: >120 NG/ML (ref 30–100)
ALBUMIN SERPL BCP-MCNC: 4.1 G/DL (ref 3.4–5)
ALP SERPL-CCNC: 32 U/L (ref 33–136)
ALT SERPL W P-5'-P-CCNC: 22 U/L (ref 10–52)
ANION GAP SERPL CALC-SCNC: 9 MMOL/L (ref 10–20)
AST SERPL W P-5'-P-CCNC: 24 U/L (ref 9–39)
BILIRUB SERPL-MCNC: 0.5 MG/DL (ref 0–1.2)
BUN SERPL-MCNC: 23 MG/DL (ref 6–23)
CALCIUM SERPL-MCNC: 9.2 MG/DL (ref 8.6–10.3)
CHLORIDE SERPL-SCNC: 107 MMOL/L (ref 98–107)
CHOLEST SERPL-MCNC: 185 MG/DL (ref 0–199)
CHOLESTEROL/HDL RATIO: 4
CO2 SERPL-SCNC: 29 MMOL/L (ref 21–32)
CREAT SERPL-MCNC: 1.01 MG/DL (ref 0.5–1.3)
EGFRCR SERPLBLD CKD-EPI 2021: 76 ML/MIN/1.73M*2
ERYTHROCYTE [DISTWIDTH] IN BLOOD BY AUTOMATED COUNT: 13.3 % (ref 11.5–14.5)
GLUCOSE SERPL-MCNC: 87 MG/DL (ref 74–99)
HCT VFR BLD AUTO: 45.8 % (ref 41–52)
HDLC SERPL-MCNC: 46 MG/DL
HGB BLD-MCNC: 14.8 G/DL (ref 13.5–17.5)
LDLC SERPL CALC-MCNC: 110 MG/DL
MCH RBC QN AUTO: 30.5 PG (ref 26–34)
MCHC RBC AUTO-ENTMCNC: 32.3 G/DL (ref 32–36)
MCV RBC AUTO: 94 FL (ref 80–100)
NON HDL CHOLESTEROL: 139 MG/DL (ref 0–149)
NRBC BLD-RTO: 0 /100 WBCS (ref 0–0)
PLATELET # BLD AUTO: 282 X10*3/UL (ref 150–450)
POTASSIUM SERPL-SCNC: 4.2 MMOL/L (ref 3.5–5.3)
PROT SERPL-MCNC: 6.1 G/DL (ref 6.4–8.2)
PSA SERPL-MCNC: 0.89 NG/ML
RBC # BLD AUTO: 4.86 X10*6/UL (ref 4.5–5.9)
SODIUM SERPL-SCNC: 141 MMOL/L (ref 136–145)
TRIGL SERPL-MCNC: 145 MG/DL (ref 0–149)
VLDL: 29 MG/DL (ref 0–40)
WBC # BLD AUTO: 5.2 X10*3/UL (ref 4.4–11.3)

## 2024-12-09 PROCEDURE — 82306 VITAMIN D 25 HYDROXY: CPT

## 2024-12-09 PROCEDURE — 80061 LIPID PANEL: CPT

## 2024-12-09 PROCEDURE — 36415 COLL VENOUS BLD VENIPUNCTURE: CPT

## 2024-12-09 PROCEDURE — 84153 ASSAY OF PSA TOTAL: CPT

## 2024-12-09 PROCEDURE — 80053 COMPREHEN METABOLIC PANEL: CPT

## 2024-12-09 PROCEDURE — 85027 COMPLETE CBC AUTOMATED: CPT

## 2024-12-10 ENCOUNTER — APPOINTMENT (OUTPATIENT)
Dept: PRIMARY CARE | Facility: CLINIC | Age: 79
End: 2024-12-10
Payer: MEDICARE

## 2024-12-10 ASSESSMENT — ENCOUNTER SYMPTOMS
PSYCHIATRIC NEGATIVE: 1
FEVER: 0
CHILLS: 0
EYES NEGATIVE: 1
SHORTNESS OF BREATH: 0
COUGH: 0
ENDOCRINE NEGATIVE: 1
NAUSEA: 0
DIFFICULTY URINATING: 0
ALLERGIC/IMMUNOLOGIC NEGATIVE: 1

## 2024-12-10 NOTE — PROGRESS NOTES
Subjective   Patient ID: Gomez Abreu is a 79 y.o. male.    HPI  Patient is here for yearly follow up with PSA. Most recent PSA was 0.89 on 12/24. . Prior PSA was 1.58 on 12/23. Prior PSA was 1.34 on 11/22. Prior PSA was 0.94 on 5/22. Chronic BPH sx are mild and stable. Denies urgency and frequency. Denies dysuria. Denies hematuria. Nocturia x1. He is taking Flomax. Hx of urinary retention. He does CIC at bedtime occasionally. ED is chronic. No medication for this. He is taking Isosorbide.       Review of Systems   Constitutional:  Negative for chills and fever.   HENT: Negative.     Eyes: Negative.    Respiratory:  Negative for cough and shortness of breath.    Cardiovascular:  Negative for chest pain and leg swelling.   Gastrointestinal:  Negative for nausea.   Endocrine: Negative.    Genitourinary:  Negative for difficulty urinating.        Negative except for documented in HPI   Allergic/Immunologic: Negative.    Neurological:         Alert & oriented X 3   Hematological:         Denies blood thinners   Psychiatric/Behavioral: Negative.         Objective   Physical Exam  Vitals and nursing note reviewed.   Constitutional:       General: He is not in acute distress.     Appearance: Normal appearance.   Pulmonary:      Effort: Pulmonary effort is normal.   Abdominal:      Tenderness: There is no abdominal tenderness.   Genitourinary:     Comments: Kidneys non palpable bilaterally  Bladder non palpable or tender  Scrotum no mass, No hydrocele  Epididymis- No spermatocele. Non Tender.  Testicles: No mass. WNL  Urethra: No discharge  Penis within normal limits... No lesions. circumcised  Prostate - symmetric, no nodules. BENIGN  Seminal Vesicals: No mass.  Sphincter tone: normal  Neurological:      Mental Status: He is alert.         Assessment/Plan   Diagnoses and all orders for this visit:  Benign prostatic hyperplasia, unspecified whether lower urinary tract symptoms present  -     tamsulosin (Flomax) 0.4 mg 24  hr capsule; Take 1 capsule (0.4 mg) by mouth once daily.  Nocturia  -     Prostate Specific Antigen; Future  Retention of urine    All available PSA values reviewed, Options discussed. Questions answered.   Diet changes for prostate health discussed and educational information given. Pros/Cons of prostate health supplements discussed.   Treatment options for LUTS reviewed  Flomax Rx given  Continue CIC at bedtime-supplies refilled  Discussed timed voiding. Discussed fluid and caffeine intake  Treatment options for ED reviewed.  Lifestyle change to help prevent UTIs discussed. Encouraged fluid intake.  BMP reviewed    F/U 1 year with PSA

## 2024-12-11 ENCOUNTER — APPOINTMENT (OUTPATIENT)
Dept: UROLOGY | Facility: CLINIC | Age: 79
End: 2024-12-11
Payer: MEDICARE

## 2024-12-11 VITALS
BODY MASS INDEX: 22.89 KG/M2 | DIASTOLIC BLOOD PRESSURE: 72 MMHG | RESPIRATION RATE: 18 BRPM | SYSTOLIC BLOOD PRESSURE: 138 MMHG | WEIGHT: 155 LBS

## 2024-12-11 DIAGNOSIS — N40.0 BENIGN PROSTATIC HYPERPLASIA, UNSPECIFIED WHETHER LOWER URINARY TRACT SYMPTOMS PRESENT: ICD-10-CM

## 2024-12-11 DIAGNOSIS — R33.9 RETENTION OF URINE: ICD-10-CM

## 2024-12-11 DIAGNOSIS — R35.1 NOCTURIA: ICD-10-CM

## 2024-12-11 PROCEDURE — 1159F MED LIST DOCD IN RCRD: CPT | Performed by: UROLOGY

## 2024-12-11 PROCEDURE — 3078F DIAST BP <80 MM HG: CPT | Performed by: UROLOGY

## 2024-12-11 PROCEDURE — 1036F TOBACCO NON-USER: CPT | Performed by: UROLOGY

## 2024-12-11 PROCEDURE — 99214 OFFICE O/P EST MOD 30 MIN: CPT | Performed by: UROLOGY

## 2024-12-11 PROCEDURE — 3075F SYST BP GE 130 - 139MM HG: CPT | Performed by: UROLOGY

## 2024-12-11 RX ORDER — TAMSULOSIN HYDROCHLORIDE 0.4 MG/1
0.4 CAPSULE ORAL DAILY
Qty: 90 CAPSULE | Refills: 3 | Status: SHIPPED | OUTPATIENT
Start: 2024-12-11 | End: 2025-12-11

## 2024-12-17 ENCOUNTER — APPOINTMENT (OUTPATIENT)
Dept: PRIMARY CARE | Facility: CLINIC | Age: 79
End: 2024-12-17
Payer: MEDICARE

## 2024-12-17 VITALS
WEIGHT: 156 LBS | OXYGEN SATURATION: 97 % | SYSTOLIC BLOOD PRESSURE: 120 MMHG | HEART RATE: 54 BPM | BODY MASS INDEX: 23.04 KG/M2 | DIASTOLIC BLOOD PRESSURE: 54 MMHG

## 2024-12-17 DIAGNOSIS — M81.0 AGE-RELATED OSTEOPOROSIS WITHOUT CURRENT PATHOLOGICAL FRACTURE: ICD-10-CM

## 2024-12-17 DIAGNOSIS — J45.20 MILD INTERMITTENT REACTIVE AIRWAY DISEASE WITHOUT COMPLICATION (HHS-HCC): ICD-10-CM

## 2024-12-17 DIAGNOSIS — R41.3 AGE-RELATED MEMORY DISORDER: ICD-10-CM

## 2024-12-17 DIAGNOSIS — J30.2 SEASONAL ALLERGIC RHINITIS, UNSPECIFIED TRIGGER: ICD-10-CM

## 2024-12-17 DIAGNOSIS — H25.812 COMBINED FORMS OF AGE-RELATED CATARACT OF LEFT EYE: ICD-10-CM

## 2024-12-17 DIAGNOSIS — I20.89 EXERTIONAL ANGINA (CMS-HCC): ICD-10-CM

## 2024-12-17 DIAGNOSIS — K21.9 GASTROESOPHAGEAL REFLUX DISEASE WITHOUT ESOPHAGITIS: ICD-10-CM

## 2024-12-17 DIAGNOSIS — I10 ESSENTIAL HYPERTENSION: Primary | ICD-10-CM

## 2024-12-17 DIAGNOSIS — N40.1 BENIGN PROSTATIC HYPERPLASIA WITH NOCTURIA: ICD-10-CM

## 2024-12-17 DIAGNOSIS — G25.0 ESSENTIAL TREMOR: ICD-10-CM

## 2024-12-17 DIAGNOSIS — E78.00 HYPERCHOLESTEREMIA: ICD-10-CM

## 2024-12-17 DIAGNOSIS — Z96.1 PSEUDOPHAKIA OF RIGHT EYE: ICD-10-CM

## 2024-12-17 DIAGNOSIS — M47.812 CERVICAL ARTHRITIS: ICD-10-CM

## 2024-12-17 DIAGNOSIS — M62.838 LEVATOR SPASM: ICD-10-CM

## 2024-12-17 DIAGNOSIS — M43.06 LUMBAR SPONDYLOLYSIS: ICD-10-CM

## 2024-12-17 DIAGNOSIS — R35.1 BENIGN PROSTATIC HYPERPLASIA WITH NOCTURIA: ICD-10-CM

## 2024-12-17 PROBLEM — H33.311 HORSESHOE RETINAL TEAR OF RIGHT EYE: Status: RESOLVED | Noted: 2023-11-16 | Resolved: 2024-12-17

## 2024-12-17 PROBLEM — H35.373 EPIRETINAL MEMBRANE (ERM) OF BOTH EYES: Status: RESOLVED | Noted: 2018-02-28 | Resolved: 2024-12-17

## 2024-12-17 PROCEDURE — 1036F TOBACCO NON-USER: CPT | Performed by: FAMILY MEDICINE

## 2024-12-17 PROCEDURE — G2211 COMPLEX E/M VISIT ADD ON: HCPCS | Performed by: FAMILY MEDICINE

## 2024-12-17 PROCEDURE — 99215 OFFICE O/P EST HI 40 MIN: CPT | Performed by: FAMILY MEDICINE

## 2024-12-17 PROCEDURE — 1159F MED LIST DOCD IN RCRD: CPT | Performed by: FAMILY MEDICINE

## 2024-12-17 PROCEDURE — 3078F DIAST BP <80 MM HG: CPT | Performed by: FAMILY MEDICINE

## 2024-12-17 PROCEDURE — 3074F SYST BP LT 130 MM HG: CPT | Performed by: FAMILY MEDICINE

## 2024-12-17 PROCEDURE — 1160F RVW MEDS BY RX/DR IN RCRD: CPT | Performed by: FAMILY MEDICINE

## 2024-12-17 NOTE — PROGRESS NOTES
Subjective   Patient ID: Gomez Abreu is a 79 y.o. male who presents for Med Management.    HPI   Recurrent sinusitis - stuffy and dry most of the time. Vaseline does help.  No recent infections.       Pain in the elbows and wrists.  Elbows fine. Wrists not so good. Injection not helpful Has had PT and then  surgery on August 2, 2024. Still cannot squeeze and shooting pains still. This was for Pantrapezial arthritis.  Dr Lawson.  No swollen.  Hard to turn doorknob. GFR is good. He did Xrays showing a lot of arthritis as Josy feels the pain in the wrist is from the neck.. to get an MRI.       Memory is stable. Word games. Dementia runs in family.      BPH nocturia. PSA 0.89 this time. Slow stream when on a cruise for some reason. Dr Anguiano. Home on catheter as needed. Uses at hs sometimes. Tamsulosin helps.  Also was on Alfuzosin.      Hyperlipidemia better on Zetia.  222 down to 185/45/110 this time.       HTN - No Dyspnea, palpitations, numbness, weakness, claudications, or double vision/ loss of vision. Chest pain daily with joints unchanged. TeePee tea. Feels it is making him feel less sluggish  Did have stress test for this.  Also CTA cardiac.  Isosorbide dropped and did not note a difference. Can monitor BP.  Would rather not have chemicals in him he does not need.  Never had high blood pressure that he recalls. Can discuss [id number] with Dr Hammer      Cataracts - slowly worse on left. Pseudophakia good on right      Essential tremor mild and not progression     GERD - on Pepcid weekly on Sunday when more going on.  So far has had no HB, melaea or blood or dysphagia.       Levator spasm none since a year ago.      Osteoporosis - Calcium and D but D is high so will cut back to 4 days per week.   Off Fosamax. DEXA 6/5/23     Seasonal allergies and asthma - good now so cancelled with pulmonologist.      Cervical and lumbar spondylosis bothersome in neck only     Actinic keratosis - dermatology cryo each time       LW and DPA - yes wife and daughter   Fernando 5/30/23   Pneumovax UTD     Review of Systems    Objective   /54 (BP Location: Left arm, Patient Position: Sitting)   Pulse 54   Wt 70.8 kg (156 lb)   SpO2 97%   BMI 23.04 kg/m²     Physical Exam  Vitals reviewed.   Constitutional:       General: He is not in acute distress.     Appearance: Normal appearance. He is normal weight.   HENT:      Head: Normocephalic.      Right Ear: Tympanic membrane, ear canal and external ear normal.      Left Ear: Tympanic membrane, ear canal and external ear normal.      Nose: Nose normal.      Mouth/Throat:      Mouth: Mucous membranes are moist.      Pharynx: Oropharynx is clear.   Eyes:      Extraocular Movements: Extraocular movements intact.      Conjunctiva/sclera: Conjunctivae normal.      Pupils: Pupils are equal, round, and reactive to light.   Neck:      Vascular: No carotid bruit.   Cardiovascular:      Rate and Rhythm: Normal rate and regular rhythm.      Pulses: Normal pulses.      Heart sounds: Normal heart sounds. No murmur heard.  Pulmonary:      Effort: Pulmonary effort is normal. No respiratory distress.      Breath sounds: Normal breath sounds.   Abdominal:      General: Abdomen is flat. Bowel sounds are normal. There is no distension.      Palpations: Abdomen is soft. There is no mass.      Tenderness: There is no abdominal tenderness.   Musculoskeletal:         General: Swelling (left wrist at site of surgery) present.      Cervical back: Normal range of motion and neck supple. No tenderness.      Comments: Shoulders with crepitance and positive Neer on right. Negative Floyd    Lymphadenopathy:      Cervical: No cervical adenopathy.   Skin:     General: Skin is warm and dry.      Findings: No rash.   Neurological:      General: No focal deficit present.      Mental Status: He is alert and oriented to person, place, and time.   Psychiatric:         Mood and Affect: Mood normal.         Thought  Content: Thought content normal.         Judgment: Judgment normal.         Assessment/Plan   Diagnoses and all orders for this visit:  Essential hypertension  -     Follow Up In Primary Care - Established  -     Comprehensive Metabolic Panel; Future  Age-related memory disorder  Benign prostatic hyperplasia with nocturia  Cervical arthritis  Combined forms of age-related cataract of left eye  Essential tremor  Exertional angina (CMS-[facility])  Gastroesophageal reflux disease without esophagitis  Hypercholesteremia  Levator spasm  Lumbar spondylolysis  Age-related osteoporosis without current pathological fracture  -     Vitamin D 25-Hydroxy,Total (for eval of Vitamin D levels); Future  Pseudophakia of right eye  Mild intermittent reactive airway disease without complication ([facility])  Seasonal allergic rhinitis, unspecified trigger  Other orders  -     Follow Up In Primary Care - Established; Future

## 2024-12-19 ENCOUNTER — HOSPITAL ENCOUNTER (OUTPATIENT)
Dept: RADIOLOGY | Facility: HOSPITAL | Age: 79
Discharge: HOME | End: 2024-12-19
Payer: MEDICARE

## 2024-12-19 DIAGNOSIS — M50.30 OTHER CERVICAL DISC DEGENERATION, UNSPECIFIED CERVICAL REGION: ICD-10-CM

## 2024-12-19 PROCEDURE — 72141 MRI NECK SPINE W/O DYE: CPT

## 2024-12-19 PROCEDURE — 72141 MRI NECK SPINE W/O DYE: CPT | Performed by: RADIOLOGY

## 2025-01-23 DIAGNOSIS — N40.0 BENIGN PROSTATIC HYPERPLASIA, UNSPECIFIED WHETHER LOWER URINARY TRACT SYMPTOMS PRESENT: ICD-10-CM

## 2025-01-24 RX ORDER — TAMSULOSIN HYDROCHLORIDE 0.4 MG/1
0.4 CAPSULE ORAL NIGHTLY
Qty: 90 CAPSULE | Refills: 3 | Status: SHIPPED | OUTPATIENT
Start: 2025-01-24

## 2025-02-19 ENCOUNTER — APPOINTMENT (OUTPATIENT)
Dept: RADIOLOGY | Facility: HOSPITAL | Age: 80
End: 2025-02-19
Payer: MEDICARE

## 2025-02-19 ENCOUNTER — HOSPITAL ENCOUNTER (OUTPATIENT)
Dept: CARDIOLOGY | Facility: HOSPITAL | Age: 80
Discharge: HOME | End: 2025-02-19
Payer: MEDICARE

## 2025-02-19 ENCOUNTER — HOSPITAL ENCOUNTER (EMERGENCY)
Facility: HOSPITAL | Age: 80
Discharge: HOME | End: 2025-02-19
Attending: EMERGENCY MEDICINE
Payer: MEDICARE

## 2025-02-19 VITALS
RESPIRATION RATE: 14 BRPM | HEIGHT: 70 IN | DIASTOLIC BLOOD PRESSURE: 69 MMHG | BODY MASS INDEX: 22.05 KG/M2 | WEIGHT: 154 LBS | HEART RATE: 72 BPM | SYSTOLIC BLOOD PRESSURE: 142 MMHG | OXYGEN SATURATION: 95 %

## 2025-02-19 DIAGNOSIS — R55 SYNCOPE, NEAR: ICD-10-CM

## 2025-02-19 DIAGNOSIS — S16.1XXA CERVICAL STRAIN, ACUTE, INITIAL ENCOUNTER: Primary | ICD-10-CM

## 2025-02-19 LAB
ALBUMIN SERPL BCP-MCNC: 4.3 G/DL (ref 3.4–5)
ALP SERPL-CCNC: 35 U/L (ref 33–136)
ALT SERPL W P-5'-P-CCNC: 58 U/L (ref 10–52)
ANION GAP SERPL CALC-SCNC: 13 MMOL/L (ref 10–20)
APPEARANCE UR: CLEAR
AST SERPL W P-5'-P-CCNC: 67 U/L (ref 9–39)
BASOPHILS # BLD AUTO: 0.06 X10*3/UL (ref 0–0.1)
BASOPHILS NFR BLD AUTO: 0.8 %
BILIRUB SERPL-MCNC: 0.4 MG/DL (ref 0–1.2)
BILIRUB UR STRIP.AUTO-MCNC: NEGATIVE MG/DL
BUN SERPL-MCNC: 26 MG/DL (ref 6–23)
CALCIUM SERPL-MCNC: 9.5 MG/DL (ref 8.6–10.3)
CARDIAC TROPONIN I PNL SERPL HS: 10 NG/L (ref 0–20)
CARDIAC TROPONIN I PNL SERPL HS: 5 NG/L (ref 0–20)
CHLORIDE SERPL-SCNC: 104 MMOL/L (ref 98–107)
CO2 SERPL-SCNC: 25 MMOL/L (ref 21–32)
COLOR UR: NORMAL
CREAT SERPL-MCNC: 1.27 MG/DL (ref 0.5–1.3)
EGFRCR SERPLBLD CKD-EPI 2021: 58 ML/MIN/1.73M*2
EOSINOPHIL # BLD AUTO: 0.19 X10*3/UL (ref 0–0.4)
EOSINOPHIL NFR BLD AUTO: 2.6 %
ERYTHROCYTE [DISTWIDTH] IN BLOOD BY AUTOMATED COUNT: 12.6 % (ref 11.5–14.5)
GLUCOSE SERPL-MCNC: 135 MG/DL (ref 74–99)
GLUCOSE UR STRIP.AUTO-MCNC: NORMAL MG/DL
HCT VFR BLD AUTO: 42.5 % (ref 41–52)
HGB BLD-MCNC: 14.3 G/DL (ref 13.5–17.5)
HOLD SPECIMEN: NORMAL
IMM GRANULOCYTES # BLD AUTO: 0.03 X10*3/UL (ref 0–0.5)
IMM GRANULOCYTES NFR BLD AUTO: 0.4 % (ref 0–0.9)
KETONES UR STRIP.AUTO-MCNC: NEGATIVE MG/DL
LACTATE SERPL-SCNC: 0.9 MMOL/L (ref 0.4–2)
LACTATE SERPL-SCNC: 2.5 MMOL/L (ref 0.4–2)
LEUKOCYTE ESTERASE UR QL STRIP.AUTO: NEGATIVE
LYMPHOCYTES # BLD AUTO: 2.14 X10*3/UL (ref 0.8–3)
LYMPHOCYTES NFR BLD AUTO: 29.8 %
MCH RBC QN AUTO: 29.8 PG (ref 26–34)
MCHC RBC AUTO-ENTMCNC: 33.6 G/DL (ref 32–36)
MCV RBC AUTO: 89 FL (ref 80–100)
MONOCYTES # BLD AUTO: 0.81 X10*3/UL (ref 0.05–0.8)
MONOCYTES NFR BLD AUTO: 11.3 %
NEUTROPHILS # BLD AUTO: 3.96 X10*3/UL (ref 1.6–5.5)
NEUTROPHILS NFR BLD AUTO: 55.1 %
NITRITE UR QL STRIP.AUTO: NEGATIVE
NRBC BLD-RTO: 0 /100 WBCS (ref 0–0)
PH UR STRIP.AUTO: 7.5 [PH]
PLATELET # BLD AUTO: 227 X10*3/UL (ref 150–450)
POTASSIUM SERPL-SCNC: 3.3 MMOL/L (ref 3.5–5.3)
PROT SERPL-MCNC: 6.4 G/DL (ref 6.4–8.2)
PROT UR STRIP.AUTO-MCNC: NORMAL MG/DL
RBC # BLD AUTO: 4.8 X10*6/UL (ref 4.5–5.9)
RBC # UR STRIP.AUTO: NEGATIVE MG/DL
RBC #/AREA URNS AUTO: NORMAL /HPF
SODIUM SERPL-SCNC: 139 MMOL/L (ref 136–145)
SP GR UR STRIP.AUTO: 1.02
SQUAMOUS #/AREA URNS AUTO: NORMAL /HPF
UROBILINOGEN UR STRIP.AUTO-MCNC: NORMAL MG/DL
WBC # BLD AUTO: 7.2 X10*3/UL (ref 4.4–11.3)
WBC #/AREA URNS AUTO: NORMAL /HPF

## 2025-02-19 PROCEDURE — 84484 ASSAY OF TROPONIN QUANT: CPT | Performed by: EMERGENCY MEDICINE

## 2025-02-19 PROCEDURE — 71045 X-RAY EXAM CHEST 1 VIEW: CPT

## 2025-02-19 PROCEDURE — 93005 ELECTROCARDIOGRAM TRACING: CPT

## 2025-02-19 PROCEDURE — 2550000001 HC RX 255 CONTRASTS: Performed by: EMERGENCY MEDICINE

## 2025-02-19 PROCEDURE — 71045 X-RAY EXAM CHEST 1 VIEW: CPT | Performed by: RADIOLOGY

## 2025-02-19 PROCEDURE — 80053 COMPREHEN METABOLIC PANEL: CPT | Performed by: EMERGENCY MEDICINE

## 2025-02-19 PROCEDURE — 96361 HYDRATE IV INFUSION ADD-ON: CPT

## 2025-02-19 PROCEDURE — 72125 CT NECK SPINE W/O DYE: CPT

## 2025-02-19 PROCEDURE — 71260 CT THORAX DX C+: CPT | Performed by: RADIOLOGY

## 2025-02-19 PROCEDURE — 70450 CT HEAD/BRAIN W/O DYE: CPT | Performed by: RADIOLOGY

## 2025-02-19 PROCEDURE — 96374 THER/PROPH/DIAG INJ IV PUSH: CPT

## 2025-02-19 PROCEDURE — 99285 EMERGENCY DEPT VISIT HI MDM: CPT | Mod: 25 | Performed by: EMERGENCY MEDICINE

## 2025-02-19 PROCEDURE — 85025 COMPLETE CBC W/AUTO DIFF WBC: CPT | Performed by: EMERGENCY MEDICINE

## 2025-02-19 PROCEDURE — 70450 CT HEAD/BRAIN W/O DYE: CPT

## 2025-02-19 PROCEDURE — 2500000004 HC RX 250 GENERAL PHARMACY W/ HCPCS (ALT 636 FOR OP/ED)

## 2025-02-19 PROCEDURE — 2500000004 HC RX 250 GENERAL PHARMACY W/ HCPCS (ALT 636 FOR OP/ED): Performed by: EMERGENCY MEDICINE

## 2025-02-19 PROCEDURE — 83605 ASSAY OF LACTIC ACID: CPT | Performed by: EMERGENCY MEDICINE

## 2025-02-19 PROCEDURE — 81001 URINALYSIS AUTO W/SCOPE: CPT | Performed by: EMERGENCY MEDICINE

## 2025-02-19 PROCEDURE — 74177 CT ABD & PELVIS W/CONTRAST: CPT

## 2025-02-19 PROCEDURE — 74177 CT ABD & PELVIS W/CONTRAST: CPT | Performed by: RADIOLOGY

## 2025-02-19 PROCEDURE — 72125 CT NECK SPINE W/O DYE: CPT | Performed by: RADIOLOGY

## 2025-02-19 PROCEDURE — 36415 COLL VENOUS BLD VENIPUNCTURE: CPT | Performed by: EMERGENCY MEDICINE

## 2025-02-19 RX ORDER — SODIUM CHLORIDE 9 MG/ML
125 INJECTION, SOLUTION INTRAVENOUS CONTINUOUS
Status: DISCONTINUED | OUTPATIENT
Start: 2025-02-19 | End: 2025-02-20 | Stop reason: HOSPADM

## 2025-02-19 RX ORDER — PROCHLORPERAZINE EDISYLATE 5 MG/ML
INJECTION INTRAMUSCULAR; INTRAVENOUS
Status: COMPLETED
Start: 2025-02-19 | End: 2025-02-19

## 2025-02-19 RX ORDER — PROCHLORPERAZINE EDISYLATE 5 MG/ML
5 INJECTION INTRAMUSCULAR; INTRAVENOUS ONCE
Status: COMPLETED | OUTPATIENT
Start: 2025-02-19 | End: 2025-02-19

## 2025-02-19 RX ORDER — PROCHLORPERAZINE MALEATE 10 MG
5 TABLET ORAL EVERY 6 HOURS PRN
Qty: 30 TABLET | Refills: 0 | Status: SHIPPED | OUTPATIENT
Start: 2025-02-19

## 2025-02-19 RX ADMIN — PROCHLORPERAZINE EDISYLATE 5 MG: 5 INJECTION INTRAMUSCULAR; INTRAVENOUS at 21:34

## 2025-02-19 RX ADMIN — IOHEXOL 87 ML: 350 INJECTION, SOLUTION INTRAVENOUS at 22:35

## 2025-02-19 RX ADMIN — PROCHLORPERAZINE EDISYLATE 5 MG: 5 INJECTION INTRAMUSCULAR; INTRAVENOUS at 21:15

## 2025-02-19 RX ADMIN — SODIUM CHLORIDE 125 ML/HR: 0.9 INJECTION, SOLUTION INTRAVENOUS at 22:17

## 2025-02-19 RX ADMIN — SODIUM CHLORIDE 1000 ML: 9 INJECTION, SOLUTION INTRAVENOUS at 21:31
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
## 2025-02-21 ENCOUNTER — APPOINTMENT (OUTPATIENT)
Dept: PRIMARY CARE | Facility: CLINIC | Age: 80
End: 2025-02-21
Payer: MEDICARE

## 2025-02-23 LAB
ATRIAL RATE: 90 BPM
P AXIS: 31 DEGREES
P OFFSET: 159 MS
P ONSET: 117 MS
PR INTERVAL: 178 MS
Q ONSET: 206 MS
QRS COUNT: 15 BEATS
QRS DURATION: 162 MS
QT INTERVAL: 404 MS
QTC CALCULATION(BAZETT): 494 MS
QTC FREDERICIA: 462 MS
R AXIS: -77 DEGREES
T AXIS: 68 DEGREES
T OFFSET: 408 MS
VENTRICULAR RATE: 90 BPM

## 2025-02-24 ENCOUNTER — HOSPITAL ENCOUNTER (EMERGENCY)
Facility: HOSPITAL | Age: 80
Discharge: OTHER INSTITUTION | End: 2025-02-24
Attending: EMERGENCY MEDICINE
Payer: MEDICARE

## 2025-02-24 ENCOUNTER — HOSPITAL ENCOUNTER (OUTPATIENT)
Dept: CARDIOLOGY | Facility: HOSPITAL | Age: 80
Discharge: HOME | End: 2025-02-24
Payer: MEDICARE

## 2025-02-24 ENCOUNTER — APPOINTMENT (OUTPATIENT)
Dept: RADIOLOGY | Facility: HOSPITAL | Age: 80
End: 2025-02-24
Payer: MEDICARE

## 2025-02-24 ENCOUNTER — HOSPITAL ENCOUNTER (INPATIENT)
Facility: HOSPITAL | Age: 80
End: 2025-02-24
Attending: STUDENT IN AN ORGANIZED HEALTH CARE EDUCATION/TRAINING PROGRAM | Admitting: STUDENT IN AN ORGANIZED HEALTH CARE EDUCATION/TRAINING PROGRAM
Payer: MEDICARE

## 2025-02-24 ENCOUNTER — DOCUMENTATION (OUTPATIENT)
Dept: CARDIOLOGY | Facility: HOSPITAL | Age: 80
End: 2025-02-24

## 2025-02-24 VITALS
TEMPERATURE: 98.6 F | SYSTOLIC BLOOD PRESSURE: 121 MMHG | HEIGHT: 70 IN | DIASTOLIC BLOOD PRESSURE: 63 MMHG | WEIGHT: 154 LBS | HEART RATE: 63 BPM | OXYGEN SATURATION: 97 % | BODY MASS INDEX: 22.05 KG/M2 | RESPIRATION RATE: 15 BRPM

## 2025-02-24 DIAGNOSIS — I25.10 CORONARY ARTERY DISEASE INVOLVING NATIVE CORONARY ARTERY OF NATIVE HEART WITHOUT ANGINA PECTORIS: ICD-10-CM

## 2025-02-24 DIAGNOSIS — I44.2 COMPLETE HEART BLOCK: ICD-10-CM

## 2025-02-24 DIAGNOSIS — I20.89 EXERTIONAL ANGINA (CMS-HCC): ICD-10-CM

## 2025-02-24 DIAGNOSIS — I44.2 HEART BLOCK AV COMPLETE: Primary | ICD-10-CM

## 2025-02-24 DIAGNOSIS — Z95.5 S/P DRUG ELUTING CORONARY STENT PLACEMENT: ICD-10-CM

## 2025-02-24 LAB
ACT BLD: 245 SEC (ref 83–199)
ACT BLD: 316 SEC (ref 83–199)
ACT BLD: NORMAL S
ACT BLD: NORMAL S
ALBUMIN SERPL BCP-MCNC: 3.9 G/DL (ref 3.4–5)
ALP SERPL-CCNC: 63 U/L (ref 33–136)
ALT SERPL W P-5'-P-CCNC: 127 U/L (ref 10–52)
ANION GAP SERPL CALC-SCNC: 15 MMOL/L (ref 10–20)
AST SERPL W P-5'-P-CCNC: 170 U/L (ref 9–39)
BASOPHILS # BLD AUTO: 0.04 X10*3/UL (ref 0–0.1)
BASOPHILS NFR BLD AUTO: 0.4 %
BILIRUB SERPL-MCNC: 1.2 MG/DL (ref 0–1.2)
BNP SERPL-MCNC: 348 PG/ML (ref 0–99)
BUN SERPL-MCNC: 21 MG/DL (ref 6–23)
CALCIUM SERPL-MCNC: 8.6 MG/DL (ref 8.6–10.3)
CARDIAC TROPONIN I PNL SERPL HS: 20 NG/L (ref 0–20)
CARDIAC TROPONIN I PNL SERPL HS: 268 NG/L (ref 0–20)
CHLORIDE SERPL-SCNC: 102 MMOL/L (ref 98–107)
CO2 SERPL-SCNC: 23 MMOL/L (ref 21–32)
CREAT SERPL-MCNC: 1.19 MG/DL (ref 0.5–1.3)
D DIMER PPP FEU-MCNC: 4682 NG/ML FEU
EGFRCR SERPLBLD CKD-EPI 2021: 63 ML/MIN/1.73M*2
EOSINOPHIL # BLD AUTO: 0.02 X10*3/UL (ref 0–0.4)
EOSINOPHIL NFR BLD AUTO: 0.2 %
ERYTHROCYTE [DISTWIDTH] IN BLOOD BY AUTOMATED COUNT: 13.2 % (ref 11.5–14.5)
FLUAV RNA RESP QL NAA+PROBE: NOT DETECTED
FLUBV RNA RESP QL NAA+PROBE: NOT DETECTED
GLUCOSE SERPL-MCNC: 120 MG/DL (ref 74–99)
HCT VFR BLD AUTO: 41.8 % (ref 41–52)
HGB BLD-MCNC: 13.9 G/DL (ref 13.5–17.5)
HOLD SPECIMEN: NORMAL
IMM GRANULOCYTES # BLD AUTO: 0.04 X10*3/UL (ref 0–0.5)
IMM GRANULOCYTES NFR BLD AUTO: 0.4 % (ref 0–0.9)
LACTATE SERPL-SCNC: 0.9 MMOL/L (ref 0.4–2)
LACTATE SERPL-SCNC: 3.2 MMOL/L (ref 0.4–2)
LYMPHOCYTES # BLD AUTO: 0.5 X10*3/UL (ref 0.8–3)
LYMPHOCYTES NFR BLD AUTO: 4.6 %
MAGNESIUM SERPL-MCNC: 2.09 MG/DL (ref 1.6–2.4)
MCH RBC QN AUTO: 30.2 PG (ref 26–34)
MCHC RBC AUTO-ENTMCNC: 33.3 G/DL (ref 32–36)
MCV RBC AUTO: 91 FL (ref 80–100)
MONOCYTES # BLD AUTO: 0.14 X10*3/UL (ref 0.05–0.8)
MONOCYTES NFR BLD AUTO: 1.3 %
NEUTROPHILS # BLD AUTO: 10.24 X10*3/UL (ref 1.6–5.5)
NEUTROPHILS NFR BLD AUTO: 93.1 %
NRBC BLD-RTO: 0 /100 WBCS (ref 0–0)
PLATELET # BLD AUTO: 201 X10*3/UL (ref 150–450)
POTASSIUM SERPL-SCNC: 4.7 MMOL/L (ref 3.5–5.3)
PROT SERPL-MCNC: 6.1 G/DL (ref 6.4–8.2)
RBC # BLD AUTO: 4.6 X10*6/UL (ref 4.5–5.9)
RSV RNA RESP QL NAA+PROBE: NOT DETECTED
SARS-COV-2 RNA RESP QL NAA+PROBE: NOT DETECTED
SODIUM SERPL-SCNC: 135 MMOL/L (ref 136–145)
WBC # BLD AUTO: 11 X10*3/UL (ref 4.4–11.3)

## 2025-02-24 PROCEDURE — 2500000004 HC RX 250 GENERAL PHARMACY W/ HCPCS (ALT 636 FOR OP/ED)

## 2025-02-24 PROCEDURE — 2550000001 HC RX 255 CONTRASTS: Performed by: STUDENT IN AN ORGANIZED HEALTH CARE EDUCATION/TRAINING PROGRAM

## 2025-02-24 PROCEDURE — 93005 ELECTROCARDIOGRAM TRACING: CPT | Mod: 59

## 2025-02-24 PROCEDURE — 99153 MOD SED SAME PHYS/QHP EA: CPT | Performed by: STUDENT IN AN ORGANIZED HEALTH CARE EDUCATION/TRAINING PROGRAM

## 2025-02-24 PROCEDURE — C1769 GUIDE WIRE: HCPCS | Performed by: STUDENT IN AN ORGANIZED HEALTH CARE EDUCATION/TRAINING PROGRAM

## 2025-02-24 PROCEDURE — 96374 THER/PROPH/DIAG INJ IV PUSH: CPT | Mod: 59

## 2025-02-24 PROCEDURE — 2500000005 HC RX 250 GENERAL PHARMACY W/O HCPCS: Performed by: STUDENT IN AN ORGANIZED HEALTH CARE EDUCATION/TRAINING PROGRAM

## 2025-02-24 PROCEDURE — 83735 ASSAY OF MAGNESIUM: CPT | Performed by: EMERGENCY MEDICINE

## 2025-02-24 PROCEDURE — 93458 L HRT ARTERY/VENTRICLE ANGIO: CPT | Performed by: STUDENT IN AN ORGANIZED HEALTH CARE EDUCATION/TRAINING PROGRAM

## 2025-02-24 PROCEDURE — 94762 N-INVAS EAR/PLS OXIMTRY CONT: CPT

## 2025-02-24 PROCEDURE — 92978 ENDOLUMINL IVUS OCT C 1ST: CPT | Performed by: STUDENT IN AN ORGANIZED HEALTH CARE EDUCATION/TRAINING PROGRAM

## 2025-02-24 PROCEDURE — C1894 INTRO/SHEATH, NON-LASER: HCPCS | Performed by: STUDENT IN AN ORGANIZED HEALTH CARE EDUCATION/TRAINING PROGRAM

## 2025-02-24 PROCEDURE — 83880 ASSAY OF NATRIURETIC PEPTIDE: CPT | Performed by: EMERGENCY MEDICINE

## 2025-02-24 PROCEDURE — 92979 ENDOLUMINL IVUS OCT C EA: CPT | Mod: LM | Performed by: STUDENT IN AN ORGANIZED HEALTH CARE EDUCATION/TRAINING PROGRAM

## 2025-02-24 PROCEDURE — 84484 ASSAY OF TROPONIN QUANT: CPT | Performed by: EMERGENCY MEDICINE

## 2025-02-24 PROCEDURE — C9600 PERC DRUG-EL COR STENT SING: HCPCS | Mod: LD | Performed by: STUDENT IN AN ORGANIZED HEALTH CARE EDUCATION/TRAINING PROGRAM

## 2025-02-24 PROCEDURE — 85025 COMPLETE CBC W/AUTO DIFF WBC: CPT | Performed by: EMERGENCY MEDICINE

## 2025-02-24 PROCEDURE — 80053 COMPREHEN METABOLIC PANEL: CPT | Performed by: EMERGENCY MEDICINE

## 2025-02-24 PROCEDURE — 7100000009 HC PHASE TWO TIME - INITIAL BASE CHARGE: Performed by: STUDENT IN AN ORGANIZED HEALTH CARE EDUCATION/TRAINING PROGRAM

## 2025-02-24 PROCEDURE — 99285 EMERGENCY DEPT VISIT HI MDM: CPT | Mod: 25 | Performed by: EMERGENCY MEDICINE

## 2025-02-24 PROCEDURE — 2500000004 HC RX 250 GENERAL PHARMACY W/ HCPCS (ALT 636 FOR OP/ED): Performed by: STUDENT IN AN ORGANIZED HEALTH CARE EDUCATION/TRAINING PROGRAM

## 2025-02-24 PROCEDURE — 85379 FIBRIN DEGRADATION QUANT: CPT | Performed by: EMERGENCY MEDICINE

## 2025-02-24 PROCEDURE — 87637 SARSCOV2&INF A&B&RSV AMP PRB: CPT | Performed by: EMERGENCY MEDICINE

## 2025-02-24 PROCEDURE — 99152 MOD SED SAME PHYS/QHP 5/>YRS: CPT | Performed by: STUDENT IN AN ORGANIZED HEALTH CARE EDUCATION/TRAINING PROGRAM

## 2025-02-24 PROCEDURE — C1887 CATHETER, GUIDING: HCPCS | Performed by: STUDENT IN AN ORGANIZED HEALTH CARE EDUCATION/TRAINING PROGRAM

## 2025-02-24 PROCEDURE — C1753 CATH, INTRAVAS ULTRASOUND: HCPCS | Performed by: STUDENT IN AN ORGANIZED HEALTH CARE EDUCATION/TRAINING PROGRAM

## 2025-02-24 PROCEDURE — 76942 ECHO GUIDE FOR BIOPSY: CPT | Mod: 59 | Performed by: STUDENT IN AN ORGANIZED HEALTH CARE EDUCATION/TRAINING PROGRAM

## 2025-02-24 PROCEDURE — C1725 CATH, TRANSLUMIN NON-LASER: HCPCS | Performed by: STUDENT IN AN ORGANIZED HEALTH CARE EDUCATION/TRAINING PROGRAM

## 2025-02-24 PROCEDURE — C1889 IMPLANT/INSERT DEVICE, NOC: HCPCS | Performed by: STUDENT IN AN ORGANIZED HEALTH CARE EDUCATION/TRAINING PROGRAM

## 2025-02-24 PROCEDURE — 96373 THER/PROPH/DIAG INJ IA: CPT | Performed by: STUDENT IN AN ORGANIZED HEALTH CARE EDUCATION/TRAINING PROGRAM

## 2025-02-24 PROCEDURE — 76937 US GUIDE VASCULAR ACCESS: CPT

## 2025-02-24 PROCEDURE — 36415 COLL VENOUS BLD VENIPUNCTURE: CPT | Performed by: EMERGENCY MEDICINE

## 2025-02-24 PROCEDURE — 2720000007 HC OR 272 NO HCPCS: Performed by: STUDENT IN AN ORGANIZED HEALTH CARE EDUCATION/TRAINING PROGRAM

## 2025-02-24 PROCEDURE — 2500000001 HC RX 250 WO HCPCS SELF ADMINISTERED DRUGS (ALT 637 FOR MEDICARE OP): Performed by: STUDENT IN AN ORGANIZED HEALTH CARE EDUCATION/TRAINING PROGRAM

## 2025-02-24 PROCEDURE — 71045 X-RAY EXAM CHEST 1 VIEW: CPT | Mod: FOREIGN READ | Performed by: RADIOLOGY

## 2025-02-24 PROCEDURE — 92978 ENDOLUMINL IVUS OCT C 1ST: CPT | Mod: LD | Performed by: STUDENT IN AN ORGANIZED HEALTH CARE EDUCATION/TRAINING PROGRAM

## 2025-02-24 PROCEDURE — 2500000005 HC RX 250 GENERAL PHARMACY W/O HCPCS: Performed by: EMERGENCY MEDICINE

## 2025-02-24 PROCEDURE — C1760 CLOSURE DEV, VASC: HCPCS | Performed by: STUDENT IN AN ORGANIZED HEALTH CARE EDUCATION/TRAINING PROGRAM

## 2025-02-24 PROCEDURE — 85347 COAGULATION TIME ACTIVATED: CPT

## 2025-02-24 PROCEDURE — 99291 CRITICAL CARE FIRST HOUR: CPT | Performed by: STUDENT IN AN ORGANIZED HEALTH CARE EDUCATION/TRAINING PROGRAM

## 2025-02-24 PROCEDURE — 2780000003 HC OR 278 NO HCPCS: Performed by: STUDENT IN AN ORGANIZED HEALTH CARE EDUCATION/TRAINING PROGRAM

## 2025-02-24 PROCEDURE — 93005 ELECTROCARDIOGRAM TRACING: CPT

## 2025-02-24 PROCEDURE — 83605 ASSAY OF LACTIC ACID: CPT | Mod: 91 | Performed by: EMERGENCY MEDICINE

## 2025-02-24 PROCEDURE — 33210 INSERT ELECTRD/PM CATH SNGL: CPT | Mod: 59 | Performed by: STUDENT IN AN ORGANIZED HEALTH CARE EDUCATION/TRAINING PROGRAM

## 2025-02-24 PROCEDURE — 33210 INSERT ELECTRD/PM CATH SNGL: CPT | Performed by: STUDENT IN AN ORGANIZED HEALTH CARE EDUCATION/TRAINING PROGRAM

## 2025-02-24 PROCEDURE — 7100000010 HC PHASE TWO TIME - EACH INCREMENTAL 1 MINUTE: Performed by: STUDENT IN AN ORGANIZED HEALTH CARE EDUCATION/TRAINING PROGRAM

## 2025-02-24 PROCEDURE — C1874 STENT, COATED/COV W/DEL SYS: HCPCS | Performed by: STUDENT IN AN ORGANIZED HEALTH CARE EDUCATION/TRAINING PROGRAM

## 2025-02-24 PROCEDURE — 2500000001 HC RX 250 WO HCPCS SELF ADMINISTERED DRUGS (ALT 637 FOR MEDICARE OP)

## 2025-02-24 PROCEDURE — 71045 X-RAY EXAM CHEST 1 VIEW: CPT

## 2025-02-24 PROCEDURE — 92928 PRQ TCAT PLMT NTRAC ST 1 LES: CPT | Performed by: STUDENT IN AN ORGANIZED HEALTH CARE EDUCATION/TRAINING PROGRAM

## 2025-02-24 DEVICE — EVEROLIMUS-ELUTING PLATINUM CHROMIUM CORONARY STENT SYSTEM
Type: IMPLANTABLE DEVICE | Site: CORONARY | Status: FUNCTIONAL
Brand: SYNERGY™ XD

## 2025-02-24 DEVICE — EVEROLIMUS-ELUTING PLATINUM CHROMIUM CORONARY STENT SYSTEM
Type: IMPLANTABLE DEVICE | Site: HEART | Status: FUNCTIONAL
Brand: SYNERGY™ XD

## 2025-02-24 RX ORDER — ASPIRIN 81 MG/1
81 TABLET ORAL DAILY
Status: DISCONTINUED | OUTPATIENT
Start: 2025-02-25 | End: 2025-02-24 | Stop reason: HOSPADM

## 2025-02-24 RX ORDER — NITROGLYCERIN 40 MG/100ML
INJECTION INTRAVENOUS AS NEEDED
Status: DISCONTINUED | OUTPATIENT
Start: 2025-02-24 | End: 2025-02-24 | Stop reason: HOSPADM

## 2025-02-24 RX ORDER — SODIUM CHLORIDE 9 MG/ML
1 INJECTION, SOLUTION INTRAVENOUS CONTINUOUS
Status: DISCONTINUED | OUTPATIENT
Start: 2025-02-24 | End: 2025-02-24 | Stop reason: HOSPADM

## 2025-02-24 RX ORDER — ACETAMINOPHEN 325 MG/1
650 TABLET ORAL EVERY 6 HOURS PRN
Status: DISCONTINUED | OUTPATIENT
Start: 2025-02-24 | End: 2025-02-24 | Stop reason: HOSPADM

## 2025-02-24 RX ORDER — CLOPIDOGREL BISULFATE 300 MG/1
TABLET, FILM COATED ORAL AS NEEDED
Status: DISCONTINUED | OUTPATIENT
Start: 2025-02-24 | End: 2025-02-24 | Stop reason: HOSPADM

## 2025-02-24 RX ORDER — ASPIRIN 325 MG
TABLET ORAL AS NEEDED
Status: DISCONTINUED | OUTPATIENT
Start: 2025-02-24 | End: 2025-02-24 | Stop reason: HOSPADM

## 2025-02-24 RX ORDER — CLOPIDOGREL BISULFATE 75 MG/1
75 TABLET ORAL DAILY
Status: DISCONTINUED | OUTPATIENT
Start: 2025-02-25 | End: 2025-02-24 | Stop reason: HOSPADM

## 2025-02-24 RX ORDER — HEPARIN SODIUM 1000 [USP'U]/ML
INJECTION, SOLUTION INTRAVENOUS; SUBCUTANEOUS AS NEEDED
Status: DISCONTINUED | OUTPATIENT
Start: 2025-02-24 | End: 2025-02-24 | Stop reason: HOSPADM

## 2025-02-24 RX ORDER — MIDAZOLAM HYDROCHLORIDE 1 MG/ML
INJECTION, SOLUTION INTRAMUSCULAR; INTRAVENOUS AS NEEDED
Status: DISCONTINUED | OUTPATIENT
Start: 2025-02-24 | End: 2025-02-24 | Stop reason: HOSPADM

## 2025-02-24 RX ORDER — LIDOCAINE HYDROCHLORIDE 20 MG/ML
INJECTION, SOLUTION INFILTRATION; PERINEURAL AS NEEDED
Status: DISCONTINUED | OUTPATIENT
Start: 2025-02-24 | End: 2025-02-24 | Stop reason: HOSPADM

## 2025-02-24 RX ORDER — VERAPAMIL HYDROCHLORIDE 2.5 MG/ML
INJECTION, SOLUTION INTRAVENOUS AS NEEDED
Status: DISCONTINUED | OUTPATIENT
Start: 2025-02-24 | End: 2025-02-24 | Stop reason: HOSPADM

## 2025-02-24 RX ORDER — ONDANSETRON HYDROCHLORIDE 2 MG/ML
4 INJECTION, SOLUTION INTRAVENOUS ONCE
Status: COMPLETED | OUTPATIENT
Start: 2025-02-24 | End: 2025-02-24

## 2025-02-24 RX ORDER — FENTANYL CITRATE 50 UG/ML
INJECTION, SOLUTION INTRAMUSCULAR; INTRAVENOUS AS NEEDED
Status: DISCONTINUED | OUTPATIENT
Start: 2025-02-24 | End: 2025-02-24 | Stop reason: HOSPADM

## 2025-02-24 RX ORDER — ONDANSETRON HYDROCHLORIDE 2 MG/ML
INJECTION, SOLUTION INTRAVENOUS AS NEEDED
Status: DISCONTINUED | OUTPATIENT
Start: 2025-02-24 | End: 2025-02-24 | Stop reason: HOSPADM

## 2025-02-24 RX ORDER — RANOLAZINE 500 MG/1
1 TABLET, EXTENDED RELEASE ORAL
COMMUNITY
Start: 2025-02-04

## 2025-02-24 RX ADMIN — ONDANSETRON 4 MG: 2 INJECTION INTRAMUSCULAR; INTRAVENOUS at 16:46

## 2025-02-24 RX ADMIN — Medication 2 L/MIN: at 10:51

## 2025-02-24 RX ADMIN — SODIUM CHLORIDE 1 ML/KG/HR: 9 INJECTION, SOLUTION INTRAVENOUS at 13:18

## 2025-02-24 RX ADMIN — ACETAMINOPHEN 650 MG: 325 TABLET ORAL at 13:20

## 2025-02-24 ASSESSMENT — PAIN SCALES - GENERAL
PAINLEVEL_OUTOF10: 1
PAINLEVEL_OUTOF10: 0 - NO PAIN
PAINLEVEL_OUTOF10: 1
PAINLEVEL_OUTOF10: 1
PAINLEVEL_OUTOF10: 0 - NO PAIN
PAINLEVEL_OUTOF10: 1
PAINLEVEL_OUTOF10: 1
PAINLEVEL_OUTOF10: 0 - NO PAIN
PAINLEVEL_OUTOF10: 0 - NO PAIN
PAINLEVEL_OUTOF10: 1
PAINLEVEL_OUTOF10: 2
PAINLEVEL_OUTOF10: 2

## 2025-02-24 ASSESSMENT — PAIN - FUNCTIONAL ASSESSMENT

## 2025-02-24 ASSESSMENT — VISUAL ACUITY: OU: 1

## 2025-02-24 NOTE — Clinical Note
Temporary pacemaker inserted. Temporary pacemaker location through right internal jugular vein. Temporary pacemaker connected to demand mode. Heart rate: 80. Current 10 (mA). Centimeters 26.

## 2025-02-24 NOTE — Clinical Note
The ECG shows a paced rhythm. The patient has temporary pacemaker. Pacemaker at 10 mA. Pacer on demand mode. ECG rate  = 60 bpm.

## 2025-02-24 NOTE — DISCHARGE INSTRUCTIONS
CARDIAC CATHETERIZATION DISCHARGE INSTRUCTIONS     FOR SUDDEN AND SEVERE CHEST PAIN, SHORTNESS OF BREATH, EXCESSIVE BLEEDING, SIGNS OF STROKE, OR CHANGES IN MENTAL STATUS YOU SHOULD CALL 911 IMMEDIATELY.     If your provider has prescribed aspirin and/or clopidogrel (Plavix), or prasugrel (Effient), or ticagrelor (Brilinta), DO NOT STOP THESE MEDICATIONS for any reason without talking to your cardiologist first. If any of these were prescribed, you must take them every day without missing a single dose. If you are getting low on these medications, contact your provider immediately for a refill.     FOR NEXT 24 HOURS  - Upon discharge, you should return home and rest for the remainder of the day and evening. You do not have to stay on bed rest but should not be very active.  It is recommended a responsible adult be with you for the first 24 hours after the procedure.    - No driving for 24 hours after procedure. Please arrange for someone to drive you home from the hospital today.     - Do not drive, operate machinery, or use power tools for 24 hours after your procedure.     - Do not make any legal decisions for 24 hours after your procedure.     - Do not drink alcoholic beverages for 24 hours after your procedure.    WOUND CARE   *FOR FEMORAL (LEG) ACCESS*  ·      Avoid heavy lifting (over 10 pounds) for 7 days, squatting or excessive bending for 2 days, and strenuous exercise for 7 days.  ·      No submerged bathing, swimming, or hot tubs for the next 7 days, or until fully healed.  ·      Avoid sexual activity for 3-4 days until any groin discomfort has ceased.     *FOR RADIAL (WRIST) ACCESS*  ·      No lifting more than 5 pounds or excessive use of the wrist for 24 hours - for example, treat your wrist as if it is sprained.  ·      Do not engage in vigorous activities (tennis, golf, bowling, weights) for at least 48 hours after the procedure.  ·      Do not submerge the wrist for 7 days after the  procedure.  ·      You should expect mild tingling in your hand and tenderness at the puncture site for up to 3 days.    - The transparent dressing should be removed from the site 24 hours after the procedure.  Wash the site gently with soap and water. Rinse well and pat dry. Keep the area clean and dry. You may apply a Band-Aid to the site. Avoid lotions, ointments, or powders until fully healed.     - You may shower the day after your procedure.      - It is normal to notice a small bruise around the puncture site and/or a small grape sized or smaller lump. Any large bruising or large lump warrants a call to the office.     - If bleeding should occur, lay down and apply pressure to the affected area for 10 minutes.  If the bleeding stops notify your physician.  If there is a large amount of bleeding or spurting of blood CALL 911 immediately.  DO NOT drive yourself to the hospital.    - You may experience some tenderness, bruising or minimal inflammation.  If you have any concerns, you may contact the Cath Lab or if any of these symptoms become excessive, contact your cardiologist or go to the emergency room.     OTHER INSTRUCTIONS  - You may take acetaminophen (Tylenol) as directed for discomfort.  If pain is not relieved with acetaminophen (Tylenol), contact your doctor.    - If you notice or experience any of the following, you should notify your doctor or seek medical attention  Chest pain or discomfort  Change in mental status or weakness in extremities.  Dizziness, light headedness, or feeling faint.  Change in the site where the procedure was performed, such as bleeding or an increased area of bruising or swelling.  Tingling, numbness, pain, or coolness in the leg/arm beyond the site where the procedure was performed.  Signs of infection (i.e. shaking chills, temperature > 100 degrees Fahrenheit, warmth, redness) in the leg/arm area where the procedure was performed.  Changes in urination   Bloody or black  stools  Vomiting blood  Severe nose bleeds  Any excessive bleeding    - If you DO NOT have an appointment with your cardiologist within 2-4 weeks following your procedure, please contact their office.      Lifestyle Recommendations for a Healthier Life:    The following lifestyle changes can have a significant positive impact on your overall health - helping you to achieve healthy weight, lower metabolic and heart disease risk and manage stress more effectively:      1. Eat whole, fresh foods. Food is one of the biggest drivers of our overall health.  Make your meals whole foods based, focusing on a wide variety of non starchy vegetables and fruits.  It also beneficial to include various nuts, seeds and high-quality animal proteins for overall balanced diet.    2. Remove the sweeteners from your diet.  Artificial sweeteners have a negative impact on our metabolic health - they can cause increase in both glucose and insulin, increasing the risk or potential for insulin resistance and abnormal blood sugar levels.  Artificial sweeteners are also excessively sweeter than regular sugar, they trick our taste buds into craving extreme forms of sweet, like an addiction.  I encourage you to avoid sugar, but also stevia, aspartame, sucralose, sugar alcohols like xylitol and maltitol.    3. Get rid of the inflammatory factors in your diet - this mainly comes from sugars of all kinds and refined vegetable oils.  These can increase our risk for changes in our metabolic health which can lead to diabetes, heart disease and other chronic disease.  You can reverse or combat the effective of inflammatory foods by increasing your intake of anti-inflammatory foods like wild-caught fish, fresh ground flax seed, fish oil and variety of fruits & vegetables.      4. Eat plenty of fiber!!  The standard American diet has very low levels of daily dietary fiber, many people barely get 15 grams per day.  There is plenty of nutrition research  that shows how high-fiber foods can help lower our blood sugar.   Eat a wide variety of fiber-rich plant-based foods including nuts, seeds, fruits, vegetables, and legumes.    5. Get enough sleep. Studies from experts in endocrinology & metabolism have shown that even a few nights of poor sleep can increase the risk of insulin resistance and abnormal blood sugar values. Make sleep a priority - it is an important factor in your health.  Develop a sleep routine including: avoid eating two-three hours before bed, practice relaxation techniques (warm bath, meditation, yoga, mindfulness) to help relax your muscles and prepare you for sleep.    Go to bed and wake up at consistent times.  Avoid screen time for at least 60-90 minutes before bedtime.    6. Make exercise part of your regular routine.  Exercise helps us manage blood sugar more effectively and makes our cells more receptive to the effect of the insulin our body makes (lowers blood sugar).  Regular aerobic exercise AND interval or strength training are ideal to help maintain a healthy weight, metabolism & lower the risk of chronic disease.      7. Control stress levels. Chronic stress can lead to elevated blood sugar, elevated blood pressure, accumulation of fat around the belly and these things increase the risk for metabolic syndrome, diabetes and heart disease.  We all have various levels of stress in our lives, we can't control all of it, but we can control how we respond to it and manage it's impact.  Find healthy outlets for stress management like meditation, yoga, deep breathing, or exercise.    Home BP monitoring instructions:   Goal < 130 / 80   Remain still, Avoid smoking, caffeinated beverages, or exercise within 30 min before BP measurements.  Ensure 5 min of quiet rest before BP measurements.  Sit correctly with back straight and supported (on a straight-backed dining chair, for example, rather than a sofa).  Sit with feet flat on the floor and legs  uncrossed.  Keep arm supported on a flat surface (such as a table), with the upper arm at heart level.  Bottom of the cuff should be placed directly above the bend of the elbow  Take a reading in the AM before breakfast 2-3 times / week   Record all readings accurately:  A written log should be brought to all appointments   Monitors with built-in memory should be brought to all clinic appointments and calibrated to our machines      Weight Management:  Since food equals calories, in order to lose weight you must either eat fewer calories, exercise more to burn off calories with activity, or both. Food that is not used to fuel the body is stored as fat.    A major component of losing weight is to make smarter food choices. Here's how:    Limit non-nutritious foods, such as:  Sugar, honey, syrups and candy  Pastries, donuts, pies, cakes and cookies  Soft drinks, sweetened juices and alcoholic beverages    Cut down on high-fat foods by:  Choosing poultry, fish or lean red meat  Choosing low-fat cooking methods, such as baking, broiling, steaming, grilling and boiling  Using low-fat or non-fat dairy products  Using vinaigrette, herbs, lemon or fat-free salad dressings  Avoiding fatty meats, such as chung, sausage, hayes, ribs and luncheon meats  Avoiding high-fat snacks like nuts, chips and chocolate  Avoiding fried foods  Using less butter, margarine, oil and mayonnaise  Avoiding high-fat gravies, cream sauces and cream-based soups    Eat a variety of foods, including:  Fruit and vegetables that are raw, steamed or baked  Whole grains, breads, cereal, rice and pasta  Dairy products, such as low-fat or non-fat milk or yogurt, low-fat cottage cheese and low-fat cheese  Protein-rich foods like chicken, turkey, fish, lean meat and legumes, or beans    Change your eating habits:  Eat three balanced meals a day to help control your hunger  Watch portion sizes and eat small servings of a variety of foods  Choose low-calorie  snacks  Eat only when you are hungry and stop when you are satisfied  Eat slowly and try not to perform other tasks while eating  Find other activities to distract you from food, such as walking, taking up a hobby or being involved in the community  Include regular exercise in your daily routine  Find a support group, if necessary, for emotional support in your weight loss effort    Patient Education: Smoking Cessation  Making the commitment to quit smoking is one of the best choices that smokers can make for themselves, but also a difficult one. There are various opportunities for support available. Here is an overview of them.  There are various forms of nicotine replacement therapy available to assist with minimizing these symptoms. They are nicotine gum, nicotine patch, nicotine nasal spray, nicotine inhaler, and nicotine lozenge.  Which kind of nicotine replacement therapy will best work for you can be discussed with your doctor or nurse to help you understand the pros and cons of each.  Non-nicotine replacement therapy is also available. Bupropion (Wellbutrin, Zyban) is a mild anti-depressant that is also effective in helping people to quit smoking. It can be used alone or with nicotine replacement therapy.   Varenicline (Chantix) is another medication that helps people who what to quit. This medication is not a form of nicotine replacement therapy, but it helps with the withdrawal symptoms.  Studies have shown that the best success rates for people trying to quit include counseling and/or support groups such as the National [employer] that is a free, confidential, 24/7, 365-day-a-year treatment referral and information service:  5-401-002-HELP (2860)    Some helpful hints include:  Avoidance. Stay away from smokers and places where you are tempted to smoke.  Activities. Exercise or do hobbies that keep your hands busy.  Alternatives. Use oral substitutes such as sugarless gum, hard candy, and carrot  sticks.  Change of habits. For example, if you usually smoke during a coffee break, then go for a walk instead.  Deep breathing. When you have the urge to smoke, do a deep breathing exercise and remind yourself why you quit.  Delay tactic. If you feel that you are about to light up, delay. Tell yourself you must wait 10 minutes. Often this simple trick will allow you to move beyond the urge  Discussion. Call a friend for support.  Drink of water. Use as an oral substitute such as water.  Many people say the reason they smoke is to deal with stress. Unfortunately, stress is a part of all our lives. When quitting, you will need to find new strategies to deal with stress. There are stress-management classes, and self-help books that can help you discover new ways to reduce and deal with stress.  The bottom line is: don't just try to go it alone-reach out for support to help you achieve your goal.

## 2025-02-24 NOTE — Clinical Note
Angioplasty of the proximal left anterior descending lesion. Inflation 1: Pressure = 20 tiffany; Duration = 15 sec. Inflation 2: Pressure = 6 tiffany; Duration = 7 sec.

## 2025-02-24 NOTE — NURSING NOTE
[facility] arrived for transport to [facility].  This RN gave report to transport team.  Will call RN to RN report.    Home going instructions specific to procedure given: Not at this time    1 Easily Arousable; Responding Appropriately: Yes    2. VS +/- 20% of preprocedure status: Yes     3. Significant complications are absent: Yes    4. Ambulates without dizziness/Age appropriate activity: Yes    5. Pulse Ox greater than or equal to 92% or above on room air /ordered oxygen treatment: Yes    6. Care Plan Complete: N/A    7. Discharge education completed and information provided to patient and family: Yes    8. If Patient had PCI performed-Stent card sent home with Patient: Yes

## 2025-02-24 NOTE — Clinical Note
Angioplasty of the left anterior descending lesion. Inflation 1: Pressure = 15 tiffany; Duration = 15 sec.

## 2025-02-24 NOTE — Clinical Note
Angioplasty of the left anterior descending lesion. Inflation 1: Pressure = 12 tiffany; Duration = 12 sec. Inflation 2: Pressure = 12 tiffany; Duration = 15 sec.
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
## 2025-02-24 NOTE — CONSULTS
Inpatient consult to Cardiology  Consult performed by: John Baig MD  Consult ordered by: Ayaz Pascal DO  Reason for consult: complete heart block      History Of Present Illness:    Mr. Gomez Abreu is a 79 y.o. non-smoker male being consulted by the Cardiology team for complete heart block, syncope and collapse. Patient with prior medical history significant for hypertension, hyperlipidemia, chest pain. He presented to ED [facility] on 02/24/2025 after 2 episodes at home of syncope and collapse. He denied chest pain, shortness of breath, palpitations, leg edema, fever, chills, orthopnea or paroxysmal nocturnal dyspnea. In the ER, EKG showing complete heart block. Patient admitted for clinical compensation.     Last Recorded Vitals:  Vitals:    02/24/25 1031 02/24/25 1032 02/24/25 1045 02/24/25 1051   BP: (!) 191/42  (!) 149/48    BP Location: Left arm  Left arm    Patient Position: Lying  Lying    Pulse: (!) 41  (!) 39    Resp: 18  18    Temp:       TempSrc:       SpO2: (!) 93% (!) 93% (!) 93% (!) 93%   Weight:       Height:           Last Labs:  CBC - 2/19/2025:  9:11 PM  7.2 14.3 227    42.5      CMP - 2/19/2025:  9:11 PM  9.5 6.4 67 --- 0.4   _ 4.3 58 35      PTT - No results in last year.  _   _ _     Troponin I, High Sensitivity   Date/Time Value Ref Range Status   02/19/2025 10:08 PM 10 0 - 20 ng/L Final   02/19/2025 09:11 PM 5 0 - 20 ng/L Final     LDL Calculated   Date/Time Value Ref Range Status   12/09/2024 07:58  (H) <=99 mg/dL Final     Comment:                                 Near   Borderline      AGE      Desirable  Optimal    High     High     Very High     0-19 Y     0 - 109     ---    110-129   >/= 130     ----    20-24 Y     0 - 119     ---    120-159   >/= 160     ----      >24 Y     0 -  99   100-129  130-159   160-189     >/=190     12/12/2023 08:20  (H) <=99 mg/dL Final     Comment:                                 Near   Borderline      AGE       "Desirable  Optimal    High     High     Very High     0-19 Y     0 - 109     ---    110-129   >/= 130     ----    20-24 Y     0 - 119     ---    120-159   >/= 160     ----      >24 Y     0 -  99   100-129  130-159   160-189     >/=190       VLDL   Date/Time Value Ref Range Status   12/09/2024 07:58 AM 29 0 - 40 mg/dL Final   12/12/2023 08:20 AM 29 0 - 40 mg/dL Final   11/14/2022 07:55 AM 31 0 - 40 mg/dL Final   11/12/2021 07:55 AM 26 0 - 40 mg/dL Final   10/09/2020 07:45 AM 16 0 - 40 mg/dL Final      Last I/O:  No intake/output data recorded.    Past Cardiology Tests (Last 3 Years):  EKG:  ECG 12 Lead 02/19/2025    Echo:  No results found for this or any previous visit from the past 1095 days.    Ejection Fractions:  No results found for: \"EF\"  Cath:  No results found for this or any previous visit from the past 1095 days.    Stress Test:  No results found for this or any previous visit from the past 1095 days.    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    Past Surgical History:  He has a past surgical history that includes Other surgical history (09/23/2019); Other surgical history (09/23/2019); Other surgical history (11/17/2021); Other surgical history (08/23/2013); Cardiac catheterization; Circumcision, primary; Eye surgery; Vasectomy; [place] tooth extraction; and Wrist surgery (Left, 08/02/2024).      Social History:  He reports that he has never smoked. He has never used smokeless tobacco. He reports that he does not drink alcohol and does not use drugs.    Family History:  Family History   Problem Relation Name Age of Onset    Hypertension " Mother Mary Anne     Pancreatic cancer Mother Mary Anne     Dementia Father Reinier     Heart disease Father Reinier     Migraines Daughter          Allergies:  Mold, Nystatin, Hay fever and allergy relief, Diltiazem, Edetate disodium, Erythromycin, Poison ivy extract, and Statins-hmg-coa reductase inhibitors    Inpatient Medications:  Scheduled medications   Medication Dose Route Frequency     PRN medications   Medication    oxygen     Continuous Medications   Medication Dose Last Rate     Outpatient Medications:  Current Outpatient Medications   Medication Instructions    albuterol 90 mcg/actuation inhaler 2 puffs, Every 6 hours PRN    alpha tocopherol (VITAMIN E) 400 Units, Daily    amLODIPine (NORVASC) 5 mg, Daily    ASCORBIC ACID, VITAMIN C, ORAL 500 mg, 2 times daily    b complex vitamins capsule 1 capsule, Daily    calcium citrate (Calcitrate) 100 mg (475 mg) split tablet 1 tablet, Daily    catheter (Bardia Red Rubber Catheter) 14 Fr misc USE PRN FOR URINARY RETENTION    cholecalciferol (VITAMIN D-3) 2,500 Units, 2 times daily    ezetimibe (ZETIA) 10 mg, Daily    famotidine (PEPCID) 20 mg, Daily    isosorbide mononitrate ER (IMDUR) 30 mg, Daily    magnesium 250 mg, Daily    nitroglycerin (NITROSTAT) 0.4 mg, Every 5 min PRN    omega 3-dha-epa-fish oil (Fish OiL) [phone] mg capsule,delayed release(DR/EC) 1 capsule, Daily    prochlorperazine (COMPAZINE) 5 mg, oral, Every 6 hours PRN    tamsulosin (FLOMAX) 0.4 mg, oral, Nightly    VITAMIN A ORAL 2,400 Units, Daily    zinc gluconate 50 mg tablet 1 tablet, Daily       Physical Exam:  General: alert, oriented  HEENT: NC/AT; EOMI; PERRLA, external ear is normal  Neck: supple; trachea midline; no masses; no JVD  Chest: clear breath sounds bilaterally; no wheezing  Cardio: regular rhythm, S1S2 normal, no murmurs  Abdomen: Soft, non-tender, non-distension, no organomegaly  Extremities: no clubbing/cyanosis/edema     Assessment/Plan     Mr. Gomez Abreu is a 79 y.o.  non-smoker male being consulted by the Cardiology team for complete heart block, syncope and collapse. Patient with prior medical history significant for hypertension, hyperlipidemia, chest pain. He presented to ED [facility] on 02/24/2025 after 2 episodes at home of syncope and collapse. He denied chest pain, shortness of breath, palpitations, leg edema, fever, chills, orthopnea or paroxysmal nocturnal dyspnea. In the ER, EKG showing complete heart block. Patient admitted for clinical compensation.    Assessment    # Syncope and Collapse / Complete Heart Block   - Patient with 2 syncopal episodes, symptomatic.  - EKG showing complete heart block.  - We will proceed with Temporary Pacemaker and Left Heart Catheterization   - The natural history of atherosclerosis was discussed with the patient. The treatment options including GDMT, percutaneous intervention or surgical intervention were discussed with the patient. All the risks, benefits and alternative procedures were discussed. Complications of the procedure were also discussed, including but not limited to risk of bleeding, stroke, infarct, infection, urgent cardiac surgery or death. All questions were answered and the informed consent was obtained. After aforementioned testing and consultation, Left Heart Catheterization with potential Percutaneous Coronary Intervention would be a reasonable approach if the patient is candidate.  - Please keep NPO.  - Echocardiogram  - Would suggest transfer to [facility] for EP assessment and Permanent Pacemaker placement - Dr. Poole.    # Hypertension  - Controlled blood pressure.  - Keep current medications including Amlodipine, Isosorbide.  - Patient counseled to keep a healthy lifestyle including regular exercise and low-sodium diet.  - Recommended home blood pressure monitoring.  - Goal of BP < 130/80mmHg.     # Hyperlipidemia  - Keep home medication with Ezetimibe - intolerant to statins.  - Counseled on healthy diet and  regular exercise.     Patient seen in the ER. This critically ill patient continues to be at-risk for clinically significant deterioration / failure due to the above mentioned dysfunctional, unstable organ systems.  I have personally identified and managed all complex critical care issues to prevent aforementioned clinical deterioration.  Critical care time is spent at bedside and/or the immediate area and has included, but is not limited to, the review of diagnostic tests, labs, radiographs, serial assessments of hemodynamics, respiratory status, ventilatory management, and family updates.  Time spent in procedures and teaching are reported separately.    Critical care time: 65 minutes     Code Status:  No Order    John Baig MD  Cardiology

## 2025-02-24 NOTE — Clinical Note
Angioplasty of the left anterior descending diagonal lesion. Inflation 1: Pressure = 15 tiffany; Duration = 15 sec. Inflation 2: Pressure = 15 tiffany; Duration = 15 sec.

## 2025-02-24 NOTE — Clinical Note
Angioplasty of the left anterior descending lesion. Inflation 1: Pressure = 12 tiffany; Duration = 12 sec. Inflation 2: Pressure = 12 tiffany; Duration = 12 sec. Inflation 3: Pressure = 12 tiffany; Duration = 12 sec.

## 2025-02-24 NOTE — Clinical Note
Vessel: LAD. Stent inserted. Inflation 1: Pressure = 12 tiffany; Duration = 30 sec. Inflation 2: Pressure = 4 tiffany; Duration = 4 sec.

## 2025-02-24 NOTE — POST-PROCEDURE NOTE
Physician Transition of Care Summary  Invasive Cardiovascular Lab    Procedure Date: 2/24/2025  Attending:    * John Baig - Primary  Resident/Fellow/Other Assistant: Surgeons and Role:  * No surgeons found with a matching role *    Indications:   Pre-op Diagnosis      * Exertional angina (CMS-HCC) [I20.89]     * Complete heart block [I44.2]    Post-procedure diagnosis:   Post-op Diagnosis     * Exertional angina (CMS-HCC) [I20.89]     * Complete heart block [I44.2]    Procedure(s):   Temporary Pacemaker Insertion    Left Heart Cath, With LV    IVUS - Coronary  25268 - MD ENDOLUMINAL CORONARY IVUS OCT I&R INITIAL VESSEL    Procedure Findings:   Complete Heart Block  S/P Successful Transvenous Temporary Pacemaker placement in RV apex  Single vessel coronary artery disease  Prox-mid LAD 90% calcified lesion - Bifurcation to 1st Dg Lau classification 1,1,0; Mid-distal LAD 70% lesion  Preserved LV systolic function  S/P Successful PCI to prox-mid LAD (bifurcation to 1st Dg - Kissing-Balloon) using one ALEXANDRO 3.0/24mm (post-dil 3.5mm NC balloon) guided by IVUS  S/P Successful PCI to mid-distal LAD using onde ALEXANDRO 2.5/16mm guided by IVUS     Description of the Procedure:     Complete Heart Block. Patient extremely symptomatic, complaining of generalized weakness, shortness of breath and chest pain, post 2 episodes of syncope and collapse. Heart rate ~28bpm. Normal BP.     S/P Successful Transvenous Temporary Pacemaker placement in RV apex. Threshold 1mA, backup HR 60bpm, output 10mA.    R radial artery access with 6F sheath. LV and Ao hemodynamic measurements. S/P Left Heart Catheterization that showed single vessel obstructive coronary artery disease.     Prox-mid LAD 90% calcified lesion - Bifurcation to 1st Dg Lau classification 1,1,0; Mid-distal LAD 70% lesion  Preserved LV systolic function    Catheter switched to 6F XB 3.5. Heparinization 100ui/Kg. ACT > 250s.     S/P successful percutaneous  coronary intervention to proximal-mid LAD (bifurcation to 1st Dg - Kissing-Balloon with 2.5mm NC balloon) using one ALEXANDRO 3.0/24mm (post-dil 3.5mm NC balloon) guided by intra-vascular ultrasound (IVUS).     S/P Successful PCI to mid-distal LAD using onde ALEXANDRO 2.5/16mm guided by IVUS.     Patient remained stable during the entire procedure. No complications. Hemostasis with TR Band.     After the procedure, back to normal sinus rhythm and improvement in hemodynamics.    Plan:    Patient loaded with ASA 325mg and Plavix 600mg. Should be discharged home keeping ASA 81mg daily and Plavix 75mg daily.   Compared to the pre-procedural EKG, post-procedural EKG with resolution of the complete heart block, back to sinus rhyhtm.   Keep hydration 100mL/h for at least 3 hours, ideally 6 hours.   Bed rest for 3 hours. Constant check for bleeding. Maintain compression band (CB) for 120 minutes past procedure. Remove 3mL of air from CB every 15 minutes until fully deflated. If recurrent bleeding occurs, re-inflate with enough air to fully restore hemostasis (2 to 3 mL, maximum of 18 mL). Maintain CB at this same level for 30 minutes before attempting to re-deflate. Once fully deflated, carefully remove CB and place a sterile dressing over access site.  Observe for one hour, checking pulse every 15 minutes.  Instruct patient not to use or bend their wrist for four hours.    Cardiac rehab. Echocardiogram. Cardiology follow up. Would suggest to keep conservative treatment and guideline-directed medical therapy (GDMT) for the remaining lesions. EP evaluation for assessment of permanent pacemaker.     Patient and family refused to be transfer to [facility], they have requested to be transferred to [facility].     Complications:   No    Stents/Implants:   Implants       Stent    Stent, Synergy Xd,  Us, 3.00 X 24mm - [id number] - Implanted        Inventory item: STENT, SYNERGY XD,  US, 3.00 X 24MM Model/Cat number: [id number]     : [employer] Lot number: [id number]    Device identifier: [id number]        GUDID Information       Request status Successful        Brand name: SYNERGY™ XD Version/Model: [id number]    Company name: [employer] MRI safety info as of 2/24/25: MR Jaime    Contains dry or latex rubber: No      GMDN P.T. name: Drug-eluting coronary artery stent, non-bioabsorbable-polymer-coated                As of 2/24/2025       Status: Implanted                      Stent, Synergy Xd, Mr Christian, 2.50 X 16mm - [id number] - Implanted        Inventory item: STENT, SYNERGY XD, MR CHRISTIAN, 2.50 X 16MM Model/Cat number: [id number]    : [employer] Lot number: [id number]    Device identifier: [id number]        GUDIV[employer]Med Information       Request status Successful        Brand name: SYNERGY™ XD Version/Model: [id number]    Company name: [employer] MRI safety info as of 2/24/25: MR Jaime    Contains dry or latex rubber: No      GMDN P.T. name: Drug-eluting coronary artery stent, non-bioabsorbable-polymer-coated                As of 2/24/2025       Status: Implanted                            Anticoagulation/Antiplatelet Plan:   Patient loaded with ASA 325mg and Plavix 600mg. Should be discharged home keeping ASA 81mg daily and Plavix 75mg daily.     Estimated Blood Loss:   3 mL    Anesthesia: Moderate Sedation Anesthesia Staff: No anesthesia staff entered.    Any Specimen(s) Removed:   No specimens collected during this procedure.    Disposition:   ICU    Electronically signed by: John Baig MD, 2/24/2025 12:41 PM

## 2025-02-24 NOTE — NURSING NOTE
I met with Gomez Abreu and his wife in his room in the cardiac cath lab S/P PCI on 2/24/25. He is alert and oriented and open to discussion. I reviewed contact information, allergies, current medications, and past medical history. We discussed what cardiac rehab entails, length of time, session length, education, and when we would be reaching out for Gomez ADDISON MirandaAbreu to start. Patient reported he is not a smoker, smoking cessation will not be part of the program. Gomez Mirandason is interested in coming to cardiac rehab. Cardiac rehab folder reviewed and provided along with our contact information. We will reach out next week the week of 3/3/25 to discuss further. All questions answered.

## 2025-02-25 LAB
ATRIAL RATE: 72 BPM
P AXIS: 35 DEGREES
P OFFSET: 159 MS
P ONSET: 113 MS
PR INTERVAL: 192 MS
Q ONSET: 209 MS
QRS COUNT: 12 BEATS
QRS DURATION: 158 MS
QT INTERVAL: 420 MS
QTC CALCULATION(BAZETT): 459 MS
QTC FREDERICIA: 446 MS
R AXIS: -71 DEGREES
T AXIS: 15 DEGREES
T OFFSET: 419 MS
VENTRICULAR RATE: 72 BPM

## 2025-02-26 LAB
Q ONSET: 202 MS
QRS COUNT: 7 BEATS
QRS DURATION: 158 MS
QT INTERVAL: 480 MS
QTC CALCULATION(BAZETT): 410 MS
QTC FREDERICIA: 432 MS
R AXIS: -83 DEGREES
T AXIS: 11 DEGREES
T OFFSET: 442 MS
VENTRICULAR RATE: 44 BPM

## 2025-02-28 ENCOUNTER — TELEPHONE (OUTPATIENT)
Dept: URGENT CARE | Facility: CLINIC | Age: 80
End: 2025-02-28

## 2025-02-28 ENCOUNTER — OFFICE VISIT (OUTPATIENT)
Dept: URGENT CARE | Facility: CLINIC | Age: 80
End: 2025-02-28
Payer: MEDICARE

## 2025-02-28 VITALS
RESPIRATION RATE: 20 BRPM | TEMPERATURE: 98.4 F | DIASTOLIC BLOOD PRESSURE: 81 MMHG | HEIGHT: 70 IN | SYSTOLIC BLOOD PRESSURE: 166 MMHG | HEART RATE: 63 BPM | OXYGEN SATURATION: 100 % | BODY MASS INDEX: 22.05 KG/M2 | WEIGHT: 154 LBS

## 2025-02-28 DIAGNOSIS — R05.1 ACUTE COUGH: ICD-10-CM

## 2025-02-28 DIAGNOSIS — R30.0 DYSURIA: Primary | ICD-10-CM

## 2025-02-28 DIAGNOSIS — R09.81 NASAL CONGESTION: ICD-10-CM

## 2025-02-28 LAB
POC APPEARANCE, URINE: CLEAR
POC BILIRUBIN, URINE: NEGATIVE
POC BLOOD, URINE: ABNORMAL
POC COLOR, URINE: YELLOW
POC CORONAVIRUS 2019 BY PCR (COV19): NOT DETECTED
POC FLU A RESULT: NOT DETECTED
POC FLU B RESULT: NOT DETECTED
POC GLUCOSE, URINE: ABNORMAL MG/DL
POC KETONES, URINE: NEGATIVE MG/DL
POC LEUKOCYTES, URINE: ABNORMAL
POC NITRITE,URINE: POSITIVE
POC PH, URINE: 5.5 PH
POC PROTEIN, URINE: ABNORMAL MG/DL
POC RSV PCR: NOT DETECTED
POC SPECIFIC GRAVITY, URINE: 1.02
POC UROBILINOGEN, URINE: 1 EU/DL

## 2025-02-28 PROCEDURE — 81003 URINALYSIS AUTO W/O SCOPE: CPT | Performed by: NURSE PRACTITIONER

## 2025-02-28 PROCEDURE — 99213 OFFICE O/P EST LOW 20 MIN: CPT | Performed by: NURSE PRACTITIONER

## 2025-02-28 RX ORDER — CEPHALEXIN 500 MG/1
500 CAPSULE ORAL 4 TIMES DAILY
Qty: 28 CAPSULE | Refills: 0 | Status: SHIPPED | OUTPATIENT
Start: 2025-02-28 | End: 2025-03-07

## 2025-02-28 NOTE — PROGRESS NOTES
"[facility]  Whitney Mcneal, APRN-CNP     Visit Note - 2/28/2025 12:50 PM   This note was generated with voice recognition software and may contain errors including spelling, grammar, syntax, and misrecognization of what was dictated.    Patient: Gomez Abreu, MRN: [id number], 79 y.o., male   PCP: Maikel Ramon MD  ------------------------------------  ALLERGIES:   Allergies   Allergen Reactions    Mold Unknown    Nystatin Unknown     Other reaction(s): joint pain stiffness    Hay Fever And Allergy Relief Dermatitis and Hives     Watery eyes, sneezing    Diltiazem Other     Feels funny in the head    Edetate Disodium Unknown     Patient states \"ringing' in ears   Stomach pain    Erythromycin Other     Patient states \"ringing' in ears   Stomach pain    Poison Ivy Extract Dermatitis    Statins-Hmg-Coa Reductase Inhibitors Other     Muscle aches    Other reaction(s): Other: See Comments   Muscle aches   Muscle aches        CURRENT MEDICATIONS:   Current Outpatient Medications   Medication Instructions    albuterol 90 mcg/actuation inhaler 2 puffs, Every 6 hours PRN    alpha tocopherol (VITAMIN E) 400 Units, Daily    amLODIPine (NORVASC) 5 mg, Daily    ASCORBIC ACID, VITAMIN C, ORAL 500 mg, 2 times daily    b complex vitamins capsule 1 capsule, Daily    calcium citrate (Calcitrate) 100 mg (475 mg) split tablet 1 tablet, Daily    catheter (Bardia Red Rubber Catheter) 14 Fr misc USE PRN FOR URINARY RETENTION    cephalexin (KEFLEX) 500 mg, oral, 4 times daily, Take with food.    cholecalciferol (VITAMIN D-3) 2,500 Units, 2 times daily    ezetimibe (ZETIA) 10 mg, Daily    famotidine (PEPCID) 20 mg, Daily    isosorbide mononitrate ER (IMDUR) 30 mg, Daily    magnesium 250 mg, Daily    nitroglycerin (NITROSTAT) 0.4 mg, Every 5 min PRN    omega 3-dha-epa-fish oil (Fish OiL) [phone] mg capsule,delayed release(DR/EC) 1 capsule, Daily    prochlorperazine (COMPAZINE) 5 mg, oral, Every 6 hours PRN    " ranolazine (Ranexa) 500 mg 12 hr tablet 1 tablet, Every 12 hours scheduled (0630,1830)    tamsulosin (FLOMAX) 0.4 mg, oral, Nightly    VITAMIN A ORAL 2,400 Units, Daily    zinc gluconate 50 mg tablet 1 tablet, Daily     ------------------------------------  PAST MEDICAL HX:  Patient Active Problem List   Diagnosis    Age-related memory disorder    Arthritis of big toe    Benign prostatic hyperplasia    Bursitis of left shoulder    Cervical arthritis    Combined forms of age-related cataract of left eye    Enthesopathy of ankle    Essential hypertension    Essential tremor    Facet arthropathy, lumbar    GERD (gastroesophageal reflux disease)    Hypercholesteremia    Lumbar spondylolysis    Osteoporosis    Nontraumatic incomplete tear of left rotator cuff    Osteoarthritis    Primary osteoarthritis of right hip    Pseudophakia of right eye    RBBB (right bundle branch block with left anterior fascicular block)    Reactive airway disease ([facility])    Seasonal allergic rhinitis    Senile hyperkeratosis    Meniere's disease of right ear    Levator spasm    Myofascial pain    Retention of urine    Exertional angina (CMS-[facility])    Complete heart block    Heart block AV complete      SURGICAL HX:  Past Surgical History:   Procedure Laterality Date    CARDIAC CATHETERIZATION      CARDIAC CATHETERIZATION N/A 2/24/2025    Procedure: Left Heart Cath, With LV;  Surgeon: John Baig MD;  Location: [facility] Cardiac Cath Lab;  Service: Cardiovascular;  Laterality: N/A;    CARDIAC CATHETERIZATION N/A 2/24/2025    Procedure: IVUS - Coronary;  Surgeon: John Baig MD;  Location: [facility] Cardiac Cath Lab;  Service: Cardiovascular;  Laterality: N/A;    CARDIAC ELECTROPHYSIOLOGY PROCEDURE N/A 2/24/2025    Procedure: Temporary Pacemaker Insertion;  Surgeon: John Baig MD;  Location: [facility] Cardiac Cath Lab;  Service: Cardiovascular;  Laterality: N/A;    CIRCUMCISION, PRIMARY      EYE SURGERY      OTHER  "SURGICAL HISTORY  09/23/2019    Esophagogastroduodenoscopy    OTHER SURGICAL HISTORY  09/23/2019    Knee surgery    OTHER SURGICAL HISTORY  11/17/2021    Shoulder surgery    OTHER SURGICAL HISTORY  08/23/2013    Colonoscopy    VASECTOMY      WISDOM TOOTH EXTRACTION      WRIST SURGERY Left 08/02/2024    tendon repair      FAMILY HX:   No pertinent history.   SOCIAL HX:    reports that he has never smoked. He has never used smokeless tobacco. .   ------------------------------------  CHIEF COMPLAINT:   Chief Complaint   Patient presents with    UTI     Dysuria X 3 days      HISTORY OF PRESENT ILLNESS: The history was obtained from patient. Gomez is a 79 y.o. male, who presents with a chief complaint of dysuria x approx 3 days. Has had burning/\"pinching\" with urination, urinary frequency, and urgency. Ivan has also had a runny nose (clear mucus), intermittent sore throat, chills, body aches, and a dry cough. He denies any abdominal pain, suprapubic discomfort, low back pain, rectal pain, scrotal or perineal pain, malaise, lethargy, and n/v; no known fevers. Also denies any bloating, abdominal distention, change in bowel habits, penile lesions or discharge, gross hematuria, urinary hesitancy (that is increased from baseline - has history of BPH and ivan has been doing well), and split or weak stream/required straining to void. Has been pushing fluids and took Azo, without much relief; has not tried any other OTC medications or conservative measures for his symptoms.     Ivan was discharged from the hospital on 2/26/25 - had an AV block and had pacer insertion and 2 stents placed. Ivan had urinary catheter several times during his hospitalization. Denies any CP, shortness of breath, dizziness, swelling in legs, fatigue, or other symptoms. Ivan still has a lot of residual bruising on his chest/neck, and it hurts to cough, so he tries to brace himself with a pillow.     Most recent HgbA1c was 5.2%. " "    REVIEW OF SYSTEMS:  10 systems reviewed negative with exception of history of present illness as listed above.    TODAY'S VITALS: /81   Pulse 63   Temp 36.9 °C (98.4 °F) (Temporal)   Resp 20   Ht 1.778 m (5' 10\")   Wt 69.9 kg (154 lb)   SpO2 100%   BMI 22.10 kg/m²     PHYSICAL EXAMINATION:  General: Pleasant, well-nourished, older male; alert and oriented, in no acute distress. Sitting comfortably on exam table.    HENT: Normocephalic. Oral mucosa moist. +nasal congestion, with clear rhinorrhea noted - nasal mucosa mildly edematous. Posterior pharynx mildly erythematous but no lesions or edema. Managing oral secretions without difficulty.   Neck:  Supple. No lymphadenopathy.  Respiratory:  Lungs are clear to auscultation; no wheezes, rhonchi, or rales. Respirations unlabored, Breath sounds are equal, Symmetrical chest wall expansion. Non-productive cough noted only upon request.   Cardiovascular:  Regular rate, Regular rhythm. Normal S1S2. No m/r/g.  Gastrointestinal:  Soft, non-tender, non-distended. No notable suprapubic tenderness; no palpable masses or organomegaly. Bowel sounds normoactive. No CVA tenderness.  Genitourinary/Rectal: Exam deferred; patient denies any penile drainage or lesions, perineal/scrotal/penile tenderness, and rectal pain.   Musculoskeletal:  Grossly normal; appropriate for age.     Integumentary:  Pink, warm, dry, and Intact. Has ecchymosis noted to chest wall/neck area; no other rashes or skin discoloration appreciated. Good skin turgor.  Neurologic:  Alert and oriented; grossly intact.    Cognition and Speech:  Oriented, Speech clear and coherent.    Psychiatric:  Cooperative, Appropriate mood & affect.      ------------------------------------  Medical Decision Making  LABORATORY or RADIOLOGICAL IMAGING ORDERS/RESULTS:   Recent Results (from the past 24 hours)   POCT UA Automated manually resulted    Collection Time: 02/28/25 12:50 PM   Result Value Ref Range    POC " Color, Urine Yellow Straw, Yellow, Light-Yellow    POC Appearance, Urine Clear Clear    POC Glucose, Urine 100 (1+) (A) NEGATIVE mg/dl    POC Bilirubin, Urine NEGATIVE NEGATIVE    POC Ketones, Urine NEGATIVE NEGATIVE mg/dl    POC Specific Gravity, Urine 1.020 1.005 - 1.035    POC Blood, Urine SMALL (1+) (A) NEGATIVE    POC PH, Urine 5.5 No Reference Range Established PH    POC Protein, Urine 30 (1+) (A) NEGATIVE mg/dl    POC Urobilinogen, Urine 1.0 0.2, 1.0 EU/DL    Poc Nitrite, Urine POSITIVE (A) NEGATIVE    POC Leukocytes, Urine SMALL (1+) (A) NEGATIVE   POCT SARS-COV-2/FLU/RSV PCR SYMPTOMATIC manually resulted    Collection Time: 02/28/25  1:36 PM   Result Value Ref Range    POC Coronavirus 2019, PCR Not Detected Not Detected    POC Flu A Result Not Detected Not Detected    POC Flu B Result Not Detected Not Detected    POC RSV PCR Not Detected Not Detected     Urine culture also done - results pending.      IMPRESSION/PLAN:  Course: Worsening; stable    1. Dysuria (Primary)  2. Nasal congestion  3. Acute cough  - POCT UA Automated manually resulted  - Urine Culture  - cephalexin (Keflex) 500 mg capsule; Take 1 capsule (500 mg) by mouth 4 times a day for 7 days. Take with food.  Dispense: 28 capsule; Refill: 0  - POCT SARS-COV-2/FLU/RSV PCR SYMPTOMATIC manually resulted    No red flags on exam today, although stressed importance of close follow up with PCP/Cardio. Symptoms/exam consistent with UTI + URI, although reviewed other potential etiologies. UA showed glucosuria (most recent A1c was 5.2%), small blood, 1+ protein, and small leuks; he did take Azo which could also be obscuring UA result. Tests for COVID/influenza/RSV were negative. Did have urinary catheter during his recent hospitalization. Will begin treatment with QID Keflex today to cover for infection. Instructed to begin antibiotic ASAP (reviewed expectations and risks/benefits + common side effects of treatment), and complete full course of  medication, even if symptoms resolve more quickly. Also advised to push fluids, rest, and to use appropriate over the counter medications for management of symptoms. Salt water gargles and Tylenol may also be helpful. Advised to follow-up with PCP in 2-3 days, or ER ASAP if develops increased fever/chills, back pain, abdominal pain, CP/SOB/dizziness, rectal/perineal pain, malaise/lethargy, nausea/vomiting, etc, or if additional concerns develop. Patient agrees with plan of care; questions were encouraged and answered.    Sending urine for culture - will contact with results/recommendations.      CESAR Starr-CNP   Advanced Practice Provider  [facility]

## 2025-02-28 NOTE — TELEPHONE ENCOUNTER
Result Communication    No results found from the In Basket message.    1:38 PM      Results were successfully communicated with the patient and they acknowledged their understanding.    No change in care plan, follow up with pcp if sx do not resolve in 7-10 days

## 2025-03-02 LAB — BACTERIA UR CULT: ABNORMAL

## 2025-03-03 ENCOUNTER — TELEPHONE (OUTPATIENT)
Dept: PRIMARY CARE | Facility: CLINIC | Age: 80
End: 2025-03-03
Payer: MEDICARE

## 2025-03-03 ENCOUNTER — TELEPHONE (OUTPATIENT)
Dept: URGENT CARE | Facility: CLINIC | Age: 80
End: 2025-03-03
Payer: MEDICARE

## 2025-03-03 DIAGNOSIS — N30.00 ACUTE CYSTITIS WITHOUT HEMATURIA: Primary | ICD-10-CM

## 2025-03-03 LAB — BACTERIA UR CULT: ABNORMAL

## 2025-03-03 RX ORDER — SULFAMETHOXAZOLE AND TRIMETHOPRIM 800; 160 MG/1; MG/1
1 TABLET ORAL 2 TIMES DAILY
Qty: 10 TABLET | Refills: 0 | Status: SHIPPED | OUTPATIENT
Start: 2025-03-03 | End: 2025-03-08

## 2025-03-03 NOTE — TELEPHONE ENCOUNTER
Result Communication    Resulted Orders   Urine Culture   Result Value Ref Range    CULTURE, URINE, ROUTINE SEE NOTE (A)       Comment:          CULTURE, URINE, ROUTINE         Micro Number:      [id number]    Test Status:       Final    Specimen Source:   Urine, clean catch    Specimen Quality:  Adequate    Result:            Greater than 100,000 CFU/mL of Escherichia coli                              E.coli                            ----------------                            INT   JESSICA     AMOX/CLAVULANATE       S     <=2     AMP/SULBACTAM          S     <=2     CEFAZOLIN              NR    <=4 **2     CEFEPIME               S     <=0.12     CEFTAZIDIME            S     <=1     CEFTRIAXONE            S     <=0.25     CIPROFLOXACIN          S     <=0.06     GENTAMICIN             S     <=1     IMIPENEM               S     <=0.25     LEVOFLOXACIN           S     <=0.12     MEROPENEM              S     <=0.25     NITROFURANTOIN         S     <=16     PIP/TAZOBACTAM         S     <=4     TRIMETHOPRIM/SULFA     S     <=20    S = Susceptible  I = Intermediate  R = Resistant  NS = Not susceptible  SDD = Susceptible Dose Dependent   * = Not Tested  NR = Not Reported  **NN = See Therapy Comments      THERAPY COMMENTS        Note 1:      For infections other than uncomplicated UTI      caused by E. coli, K. pneumoniae or P. mirabilis:      Cefazolin is resistant if JESSICA > or = 8 mcg/mL.      (Distinguishing susceptible versus intermediate      for isolates with JESSICA < or = 4 mcg/mL requires      additional testing.)        Note 2:      For uncomplicated UTI caused by E. coli,      K. pneumoniae or P. mirabilis: Cefazolin is      susceptible if JESSICA <32 mcg/mL and predicts      susceptible to the oral agents cefaclor, cefdinir,      cefpodoxime, cefprozil, cefuroxime, cephalexin      and loracarbef.         2:45 PM      Results were successfully communicated with the patient and they acknowledged their understanding. PCP called  in Bactrim DS

## 2025-03-03 NOTE — TELEPHONE ENCOUNTER
Pt was prescribed Keflex last Friday from  urgent care for UTI - he is not feeling any better- can  you check the culture and see if a different antibiotic needs to be prescribed?

## 2025-03-10 ENCOUNTER — APPOINTMENT (OUTPATIENT)
Dept: CARDIOLOGY | Facility: CLINIC | Age: 80
End: 2025-03-10
Payer: MEDICARE

## 2025-03-10 VITALS
BODY MASS INDEX: 21.76 KG/M2 | HEIGHT: 70 IN | WEIGHT: 152 LBS | SYSTOLIC BLOOD PRESSURE: 148 MMHG | DIASTOLIC BLOOD PRESSURE: 62 MMHG | OXYGEN SATURATION: 99 % | HEART RATE: 67 BPM

## 2025-03-10 DIAGNOSIS — Z95.5 HX OF HEART ARTERY STENT: ICD-10-CM

## 2025-03-10 DIAGNOSIS — I44.2 COMPLETE HEART BLOCK: Primary | ICD-10-CM

## 2025-03-10 DIAGNOSIS — I44.2 HEART BLOCK AV COMPLETE: ICD-10-CM

## 2025-03-10 DIAGNOSIS — E78.00 HYPERCHOLESTEREMIA: ICD-10-CM

## 2025-03-10 DIAGNOSIS — Z95.0 PACEMAKER: ICD-10-CM

## 2025-03-10 DIAGNOSIS — I10 ESSENTIAL HYPERTENSION: ICD-10-CM

## 2025-03-10 DIAGNOSIS — I45.2 RBBB (RIGHT BUNDLE BRANCH BLOCK WITH LEFT ANTERIOR FASCICULAR BLOCK): ICD-10-CM

## 2025-03-10 DIAGNOSIS — I70.90 ATHEROSCLEROSIS: ICD-10-CM

## 2025-03-10 PROCEDURE — 1160F RVW MEDS BY RX/DR IN RCRD: CPT | Performed by: STUDENT IN AN ORGANIZED HEALTH CARE EDUCATION/TRAINING PROGRAM

## 2025-03-10 PROCEDURE — 3077F SYST BP >= 140 MM HG: CPT | Performed by: STUDENT IN AN ORGANIZED HEALTH CARE EDUCATION/TRAINING PROGRAM

## 2025-03-10 PROCEDURE — 93000 ELECTROCARDIOGRAM COMPLETE: CPT | Performed by: STUDENT IN AN ORGANIZED HEALTH CARE EDUCATION/TRAINING PROGRAM

## 2025-03-10 PROCEDURE — 99215 OFFICE O/P EST HI 40 MIN: CPT | Performed by: STUDENT IN AN ORGANIZED HEALTH CARE EDUCATION/TRAINING PROGRAM

## 2025-03-10 PROCEDURE — 3078F DIAST BP <80 MM HG: CPT | Performed by: STUDENT IN AN ORGANIZED HEALTH CARE EDUCATION/TRAINING PROGRAM

## 2025-03-10 PROCEDURE — 1036F TOBACCO NON-USER: CPT | Performed by: STUDENT IN AN ORGANIZED HEALTH CARE EDUCATION/TRAINING PROGRAM

## 2025-03-10 PROCEDURE — 1159F MED LIST DOCD IN RCRD: CPT | Performed by: STUDENT IN AN ORGANIZED HEALTH CARE EDUCATION/TRAINING PROGRAM

## 2025-03-10 RX ORDER — CLOPIDOGREL BISULFATE 75 MG/1
TABLET ORAL DAILY
COMMUNITY

## 2025-03-10 RX ORDER — PANTOPRAZOLE SODIUM 20 MG/1
20 TABLET, DELAYED RELEASE ORAL
COMMUNITY

## 2025-03-10 RX ORDER — ASPIRIN 81 MG/1
81 TABLET ORAL DAILY
COMMUNITY

## 2025-03-10 NOTE — PROGRESS NOTES
Chief Complaint   Patient presents with    New Patient Visit     2 week follow up PCI     HPI:  I was requested by Dr. Ramon to evaluate this patient in consultation for cardiac assessment.    Mr. Gomez Abreu is a 79 y.o. year old non-smoker male patient with prior medical history significant for complete heart block, syncope and collapse, CAD S/P PCI to mid LAD / 1st Dg (02/2025), S/P PPM (02/2025), hypertension, hyperlipidemia, chest pain. He presented to ED [facility] on 02/24/2025 after 2 episodes at home of syncope and collapse. He denied chest pain, shortness of breath, palpitations, leg edema, fever, chills, orthopnea or paroxysmal nocturnal dyspnea. In the ER, EKG showing complete heart block. Patient admitted for clinical compensation. Underwent temporary pacemaker placement and PCI to LAD / 1st Dg. Transferred to [facility] (chelsey is the Director for Heart Failure) and S/P permanent pacemaker placement. He is currently asymptomatic.   EKG shows atrial-paced rhythm with bifascicular block (RBBB and LAFB) and no signs of acute ischemic changes.  Left Heart Catheterization (02/24/2025):  Complete Heart Block  S/P Successful Transvenous Temporary Pacemaker placement in RV apex  Single vessel coronary artery disease  Prox-mid LAD 90% calcified lesion - Bifurcation to 1st Dg Lau classification 1,1,0; Mid-distal LAD 70% lesion  Preserved LV systolic function  S/P Successful PCI to prox-mid LAD (bifurcation to 1st Dg - Kissing-Balloon) using one ALEXANDRO 3.0/24mm (post-dil 3.5mm NC balloon) guided by IVUS  S/P Successful PCI to mid-distal LAD using onde ALEXANDRO 2.5/16mm guided by IVUS     Past Medical History  Past Medical History:   Diagnosis Date    Encounter for other preprocedural examination 01/27/2020    Preprocedural examination    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    Personal history of other diseases of the musculoskeletal system and connective tissue 09/23/2019    History of tendinitis     "Personal history of other specified conditions 09/23/2019    History of urinary retention       Past Surgical History  Past Surgical History:   Procedure Laterality Date    CARDIAC CATHETERIZATION      CARDIAC CATHETERIZATION N/A 2/24/2025    Procedure: Left Heart Cath, With LV;  Surgeon: John Baig MD;  Location: [facility] Cardiac Cath Lab;  Service: Cardiovascular;  Laterality: N/A;    CARDIAC CATHETERIZATION N/A 2/24/2025    Procedure: IVUS - Coronary;  Surgeon: John Baig MD;  Location: [facility] Cardiac Cath Lab;  Service: Cardiovascular;  Laterality: N/A;    CARDIAC ELECTROPHYSIOLOGY PROCEDURE N/A 2/24/2025    Procedure: Temporary Pacemaker Insertion;  Surgeon: John Baig MD;  Location: [facility] Cardiac Cath Lab;  Service: Cardiovascular;  Laterality: N/A;    CIRCUMCISION, PRIMARY      EYE SURGERY      OTHER SURGICAL HISTORY  09/23/2019    Esophagogastroduodenoscopy    OTHER SURGICAL HISTORY  09/23/2019    Knee surgery    OTHER SURGICAL HISTORY  11/17/2021    Shoulder surgery    OTHER SURGICAL HISTORY  08/23/2013    Colonoscopy    VASECTOMY      WISDOM TOOTH EXTRACTION      WRIST SURGERY Left 08/02/2024    tendon repair       Past Family History  Family History   Problem Relation Name Age of Onset    Hypertension Mother Tavares     Pancreatic cancer Mother Tavares     Dementia Father Reinier     Heart disease Father Reinier     Migraines Daughter         Allergy History  Allergies   Allergen Reactions    Mold Unknown    Nystatin Unknown     Other reaction(s): joint pain stiffness    Hay Fever And Allergy Relief Dermatitis and Hives     Watery eyes, sneezing    Diltiazem Other     Feels funny in the head    Edetate Disodium Unknown     Patient states \"ringing' in ears   Stomach pain    Erythromycin Other     Patient states \"ringing' in ears   Stomach pain    Poison Ivy Extract Dermatitis    Statins-Hmg-Coa Reductase Inhibitors Other     Muscle aches    Other reaction(s): Other: See " "Comments   Muscle aches   Muscle aches       Past Social History  Social History     Socioeconomic History    Marital status:    Tobacco Use    Smoking status: Never    Smokeless tobacco: Never   Vaping Use    Vaping status: Never Used   Substance and Sexual Activity    Alcohol use: Never    Drug use: Never     Social Drivers of Health     Food Insecurity: No Food Insecurity (2/24/2025)    Received from [facility]    Hunger Vital Sign     Worried About Running Out of Food in the Last Year: Never true     Ran Out of Food in the Last Year: Never true   Transportation Needs: No Transportation Needs (2/24/2025)    Received from [facility]    PRAPARE - Transportation     Lack of Transportation (Medical): No     Lack of Transportation (Non-Medical): No   Intimate Partner Violence: Not At Risk (2/24/2025)    Received from [facility]    Humiliation, Afraid, Rape, and Kick questionnaire     Fear of Current or Ex-Partner: No     Emotionally Abused: No     Physically Abused: No     Sexually Abused: No   Housing Stability: Low Risk  (2/24/2025)    Received from [facility]    Housing Stability Vital Sign     Unable to Pay for Housing in the Last Year: No     Number of Times Moved in the Last Year: 0     Homeless in the Last Year: No       Social History     Tobacco Use   Smoking Status Never   Smokeless Tobacco Never       Review of Systems:  A total of 12 systems have been reviewed and are negative except for the aforementioned findings described in HPI.     Objective Data:  Last Recorded Vitals:  Vitals:    03/10/25 1530   BP: 148/62   Pulse: 67   SpO2: 99%   Weight: 68.9 kg (152 lb)   Height: 1.778 m (5' 10\")       Last Labs:  CBC - 2/24/2025: 10:46 AM  11.0 13.9 201    41.8      CMP - 2/24/2025: 10:46 AM  8.6 6.1 170 --- 1.2   _ 3.9 127 63      PTT - No results in last year.  _   _ _     TROPHS   Date/Time Value Ref Range Status   02/24/2025 01:46  0 - 20 ng/L Final   02/24/2025 10:46 AM 20 0 - 20 ng/L " Final   02/19/2025 10:08 PM 10 0 - 20 ng/L Final     BNP   Date/Time Value Ref Range Status   02/24/2025 10:46  0 - 99 pg/mL Final     LDLCALC   Date/Time Value Ref Range Status   12/09/2024 07:58  <=99 mg/dL Final     Comment:                                 Near   Borderline      AGE      Desirable  Optimal    High     High     Very High     0-19 Y     0 - 109     ---    110-129   >/= 130     ----    20-24 Y     0 - 119     ---    120-159   >/= 160     ----      >24 Y     0 -  99   100-129  130-159   160-189     >/=190     12/12/2023 08:20  <=99 mg/dL Final     Comment:                                 Near   Borderline      AGE      Desirable  Optimal    High     High     Very High     0-19 Y     0 - 109     ---    110-129   >/= 130     ----    20-24 Y     0 - 119     ---    120-159   >/= 160     ----      >24 Y     0 -  99   100-129  130-159   160-189     >/=190       VLDL   Date/Time Value Ref Range Status   12/09/2024 07:58 AM 29 0 - 40 mg/dL Final   12/12/2023 08:20 AM 29 0 - 40 mg/dL Final   11/14/2022 07:55 AM 31 0 - 40 mg/dL Final   11/12/2021 07:55 AM 26 0 - 40 mg/dL Final   10/09/2020 07:45 AM 16 0 - 40 mg/dL Final        Patient Medications:  Outpatient Encounter Medications as of 3/10/2025   Medication Sig Dispense Refill    alpha tocopherol (Vitamin E) 268 mg (400 unit) capsule Take 1 capsule (400 Units) by mouth once daily.      ASCORBIC ACID, VITAMIN C, ORAL Take 500 mg by mouth twice a day.      aspirin 81 mg EC tablet Take 1 tablet (81 mg) by mouth once daily.      calcium citrate (Calcitrate) 100 mg (475 mg) split tablet Take 2 half tablet (950 mg) by mouth once daily.      clopidogrel (Plavix) 75 mg tablet Take by mouth once daily.      ezetimibe (Zetia) 10 mg tablet Take 1 tablet (10 mg) by mouth once daily. 30 tablet 11    pantoprazole (ProtoNix) 20 mg EC tablet Take 1 tablet (20 mg) by mouth once daily in the morning. Take before meals. 10mg qd  Do not crush, chew, or split.       tamsulosin (Flomax) 0.4 mg 24 hr capsule TAKE 1 CAPSULE AT BEDTIME 90 capsule 3    zinc gluconate 50 mg tablet Take 1 tablet (50 mg) by mouth once daily.      albuterol 90 mcg/actuation inhaler Inhale 2 puffs every 6 hours if needed for wheezing or shortness of breath. (Patient not taking: Reported on 2025)      amLODIPine (Norvasc) 5 mg tablet Take 1 tablet (5 mg) by mouth once daily. (Patient not taking: Reported on 3/10/2025)      b complex vitamins capsule Take 1 capsule by mouth once daily. (Patient not taking: Reported on 3/10/2025)      catheter (Bardia Red Rubber Catheter) 14 Fr misc USE PRN FOR URINARY RETENTION (Patient not taking: Reported on 3/10/2025) 30 each 2    [] cephalexin (Keflex) 500 mg capsule Take 1 capsule (500 mg) by mouth 4 times a day for 7 days. Take with food. (Patient not taking: Reported on 3/10/2025) 28 capsule 0    cholecalciferol (Vitamin D-3) 5,000 Units tablet Take 0.5 tablets (2,500 Units) by mouth twice a day. (Patient not taking: Reported on 3/10/2025)      famotidine (Pepcid) 20 mg tablet Take 1 tablet (20 mg) by mouth once daily. (Patient not taking: Reported on 3/10/2025)      isosorbide mononitrate ER (Imdur) 30 mg 24 hr tablet Take 1 tablet (30 mg) by mouth once daily. (Patient not taking: Reported on 3/10/2025) 30 tablet 11    magnesium 250 mg tablet Take 1 tablet (250 mg) by mouth once daily. (Patient not taking: Reported on 3/10/2025)      nitroglycerin (Nitrostat) 0.4 mg SL tablet Place 1 tablet (0.4 mg) under the tongue every 5 minutes if needed for chest pain. (Patient not taking: Reported on 3/10/2025)      omega 3-dha-epa-fish oil (Fish OiL) 125-157-835-600 mg capsule,delayed release(DR/EC) Take 1 capsule by mouth once daily. (Patient not taking: Reported on 3/10/2025)      prochlorperazine (Compazine) 10 mg tablet Take 0.5 tablets (5 mg) by mouth every 6 hours if needed for nausea or vomiting. (Patient not taking: Reported on 3/10/2025) 30 tablet 0     ranolazine (Ranexa) 500 mg 12 hr tablet Take 1 tablet (500 mg) by mouth every 12 hours. (Patient not taking: Reported on 3/10/2025)      [] sulfamethoxazole-trimethoprim (Bactrim DS) 800-160 mg tablet Take 1 tablet by mouth 2 times a day for 5 days. 10 tablet 0    VITAMIN A ORAL Take 2,400 Units by mouth once daily. (Patient not taking: Reported on 3/10/2025)       No facility-administered encounter medications on file as of 3/10/2025.       Physical Exam:  General: alert, oriented and in no acute distress  HEENT: NC/AT; EOMI; PERRLA, external ear is normal  Neck: supple; trachea midline; no masses; no JVD  Chest: clear breath sounds bilaterally; no wheezing  Cardio: regular rhythm, S1S2 normal, no murmurs. Pacemaker L subclavian  Abdomen: Soft, non-tender, non-distension, no organomegaly  Extremities: no clubbing/cyanosis/edema. Good healing R wrist  Neuro: Grossly intact     Psychiatric: Normal mood and affect     Past Cardiology Results (Last 3 Years):  EKG:  ECG 12 lead (Clinic Performed) 03/10/2025      Electrocardiogram 12 Lead 2025      ECG 12 lead 2025      ECG 12 Lead 2025    Echo:  Echo Results:  No results found for this or any previous visit from the past 365 days.     Cath:  Cardiac Catheterization Procedure 2025    CV NCDR CATHPCI V5 COLLECTION FORM   Stress Test:  No results found for this or any previous visit from the past 1095 days.    Cardiac Imaging:  CT angio coronary art with heartflow if score >30% 2024       Assessment/Plan     Mr. Gomez Abreu is a 79 y.o. year old non-smoker male patient with prior medical history significant for complete heart block, syncope and collapse, CAD S/P PCI to mid LAD / 1st Dg (2025), S/P PPM (2025), hypertension, hyperlipidemia, chest pain. He presented to ED [facility] on 2025 after 2 episodes at home of syncope and collapse. He denied chest pain, shortness of breath, palpitations, leg edema, fever,  chills, orthopnea or paroxysmal nocturnal dyspnea. In the ER, EKG showing complete heart block. Patient admitted for clinical compensation. Underwent temporary pacemaker placement and PCI to LAD / 1st Dg. Transferred to [facility] (nephew is the Director for Heart Failure) and S/P permanent pacemaker placement. He is currently asymptomatic.     Assessment     # Syncope and Collapse / Complete Heart Block / CAD S/P PCI to LAD / 1st Dg (02/24/2025)  - Patient with 2 syncopal episodes, symptomatic.  - Initial EKG showing complete heart block.  - New EKG showed atrial-paced rhythm with bifascicular block (RBBB and LAFB) and no signs of acute ischemic changes.  - Left Heart Catheterization (02/24/2025):  Complete Heart Block  S/P Successful Transvenous Temporary Pacemaker placement in RV apex  Single vessel coronary artery disease  Prox-mid LAD 90% calcified lesion - Bifurcation to 1st Dg Lau classification 1,1,0; Mid-distal LAD 70% lesion  Preserved LV systolic function  S/P Successful PCI to prox-mid LAD (bifurcation to 1st Dg - Kissing-Balloon) using one ALEXANDRO 3.0/24mm (post-dil 3.5mm NC balloon) guided by IVUS  S/P Successful PCI to mid-distal LAD using onde ALEXANDRO 2.5/16mm guided by IVUS   - Keep ASA, Clopidogrel, ezetimibe (patient intolerant to statin). Patient not on beta-blocker at this point, previously bradycardic.  - Cardiac rehab.  - Follow up in 6 months.    # S/P PPM (02/2025)  - EKG shows atrial-paced rhythm with bifascicular block (RBBB and LAFB) and no signs of acute ischemic changes.  - Follow up with EP team - [facility].     # Hypertension  - Controlled blood pressure.  - Keep current medications including Amlodipine if BP is elevated.  - Patient counseled to keep a healthy lifestyle including regular exercise and low-sodium diet.  - Recommended home blood pressure monitoring.  - Goal of BP < 130/80mmHg.      # Hyperlipidemia  - Keep home medication with Ezetimibe - intolerant to statins.  -  Counseled on healthy diet and regular exercise.     We have discussed the most common side effects of the prescribed medications, indications, drug interactions, risks, complications, and alternatives of medications/therapeutics were explained and discussed. The patient has been requested to monitor closely for any untoward side effects or complications of medications. The patient has been strongly advised to be compliant with the recommendations, all the questions and concerns have been addressed. The patient has been also instructed to call, to return sooner or to go to the emergency department if symptoms persist or get worsen. The patient voiced understanding and denies any further questions at this time.     This note was transcribed using the Dragon Dictation system. There may be grammatical, punctuation, or verbiage errors that occur with voice recognition programs.    Counseling greater than 50% of visit regarding all cardiac issues.    Thank you, Dr. Ramon, for allowing me to participate in the care of this patient. Please do not hesitate to contact me with any further questions or concerns.    John Baig MD  Cardiology

## 2025-03-13 ENCOUNTER — APPOINTMENT (OUTPATIENT)
Dept: URBAN - METROPOLITAN AREA CLINIC 204 | Age: 80
Setting detail: DERMATOLOGY
End: 2025-03-13

## 2025-03-13 DIAGNOSIS — D18.0 HEMANGIOMA: ICD-10-CM

## 2025-03-13 DIAGNOSIS — L82.1 OTHER SEBORRHEIC KERATOSIS: ICD-10-CM

## 2025-03-13 DIAGNOSIS — D49.2 NEOPLASM OF UNSPECIFIED BEHAVIOR OF BONE, SOFT TISSUE, AND SKIN: ICD-10-CM

## 2025-03-13 DIAGNOSIS — L57.8 OTHER SKIN CHANGES DUE TO CHRONIC EXPOSURE TO NONIONIZING RADIATION: ICD-10-CM

## 2025-03-13 DIAGNOSIS — L57.0 ACTINIC KERATOSIS: ICD-10-CM

## 2025-03-13 DIAGNOSIS — D22 MELANOCYTIC NEVI: ICD-10-CM

## 2025-03-13 DIAGNOSIS — L82.0 INFLAMED SEBORRHEIC KERATOSIS: ICD-10-CM

## 2025-03-13 PROBLEM — D22.61 MELANOCYTIC NEVI OF RIGHT UPPER LIMB, INCLUDING SHOULDER: Status: ACTIVE | Noted: 2025-03-13

## 2025-03-13 PROBLEM — D18.01 HEMANGIOMA OF SKIN AND SUBCUTANEOUS TISSUE: Status: ACTIVE | Noted: 2025-03-13

## 2025-03-13 PROCEDURE — OTHER COUNSELING: OTHER

## 2025-03-13 PROCEDURE — OTHER LIQUID NITROGEN: OTHER

## 2025-03-13 PROCEDURE — 17000 DESTRUCT PREMALG LESION: CPT | Mod: 59

## 2025-03-13 PROCEDURE — 17110 DESTRUCT B9 LESION 1-14: CPT

## 2025-03-13 PROCEDURE — 17003 DESTRUCT PREMALG LES 2-14: CPT | Mod: 59

## 2025-03-13 PROCEDURE — OTHER SURGICAL DECISION MAKING: OTHER

## 2025-03-13 PROCEDURE — OTHER DEFER: OTHER

## 2025-03-13 PROCEDURE — OTHER MIPS QUALITY: OTHER

## 2025-03-13 PROCEDURE — 99214 OFFICE O/P EST MOD 30 MIN: CPT | Mod: 25

## 2025-03-13 ASSESSMENT — LOCATION ZONE DERM
LOCATION ZONE: ARM
LOCATION ZONE: SCALP
LOCATION ZONE: FACE
LOCATION ZONE: NECK
LOCATION ZONE: EAR
LOCATION ZONE: TRUNK
LOCATION ZONE: HAND

## 2025-03-13 ASSESSMENT — LOCATION DETAILED DESCRIPTION DERM
LOCATION DETAILED: LEFT LATERAL UPPER BACK
LOCATION DETAILED: RIGHT INFERIOR LATERAL FOREHEAD
LOCATION DETAILED: RIGHT INFERIOR ANTERIOR NECK
LOCATION DETAILED: RIGHT MEDIAL FRONTAL SCALP
LOCATION DETAILED: LEFT LATERAL ZYGOMA
LOCATION DETAILED: LEFT HYPOTHENAR EMINENCE
LOCATION DETAILED: RIGHT POSTERIOR SHOULDER
LOCATION DETAILED: LEFT SUPERIOR UPPER BACK
LOCATION DETAILED: LEFT MID-UPPER BACK
LOCATION DETAILED: RIGHT CYMBA CONCHA

## 2025-03-13 ASSESSMENT — LOCATION SIMPLE DESCRIPTION DERM
LOCATION SIMPLE: RIGHT SCALP
LOCATION SIMPLE: LEFT ZYGOMA
LOCATION SIMPLE: RIGHT EAR
LOCATION SIMPLE: RIGHT ANTERIOR NECK
LOCATION SIMPLE: RIGHT FOREHEAD
LOCATION SIMPLE: RIGHT SHOULDER
LOCATION SIMPLE: LEFT UPPER BACK
LOCATION SIMPLE: LEFT HAND

## 2025-03-13 NOTE — PROCEDURE: LIQUID NITROGEN
Medical Necessity Information: It is in your best interest to select a reason for this procedure from the list below. All of these items fulfill various CMS LCD requirements except the new and changing color options.
Show Spray Paint Technique Variable?: Yes
Post-Care Instructions: I reviewed with the patient in detail post-care instructions. Patient is to wear sunprotection, and avoid picking at any of the treated lesions. Pt may apply Vaseline to crusted or scabbing areas.
Detail Level: Detailed
Add 52 Modifier (Optional): no
Consent: The patient's consent was obtained including but not limited to risks of crusting, scabbing, blistering, scarring, darker or lighter pigmentary change, recurrence, incomplete removal and infection.
Spray Paint Text: The liquid nitrogen was applied to the skin utilizing a spray paint frosting technique.
Medical Necessity Clause: This procedure was medically necessary because the lesions that were treated were:
Aperture Size (Optional): C
Consent: The patient's verbal consent was obtained including but not limited to risks of crusting, scabbing, blistering, scarring, darker or lighter pigmentary change, recurrence, incomplete removal and infection.
Number Of Freeze-Thaw Cycles: 2 freeze-thaw cycles
Duration Of Freeze Thaw-Cycle (Seconds): 5
Application Tool (Optional): Liquid Nitrogen Sprayer

## 2025-03-13 NOTE — PROCEDURE: DEFER
Size Of Lesion In Cm (Optional): 0.4
Detail Level: Detailed
Procedure To Be Performed At Next Visit: Biopsy by shave method
Introduction Text (Please End With A Colon): The following procedure was deferred:
X Size Of Lesion In Cm (Optional): 0

## 2025-03-13 NOTE — PROCEDURE: SURGICAL DECISION MAKING
Initial Decision For Surgery?: Yes
Risk Assessment Explanation (Does Not Render In The Note): Clinical determination of the probability and/or consequences of an event, such as surgery. Clinical assessment of the level of risk is affected by the nature of the event under consideration for the patient. Modifier 57 is used to indicate an Evaluation and Management (E/M) service resulted in the initial decision to perform surgery either the day before a major surgery (90 day global) or the day of a major surgery.
Discussion: of the minor procedure to be done in the office. Tissue to be submitted for pathology review.
Complexity (Necessary For Coding; Major - 90 Day Global With Some Exceptions; Minor - 10 Day Global): minor

## 2025-03-19 ENCOUNTER — TELEPHONE (OUTPATIENT)
Dept: PRIMARY CARE | Facility: CLINIC | Age: 80
End: 2025-03-19
Payer: MEDICARE

## 2025-03-19 NOTE — TELEPHONE ENCOUNTER
THE UTI HAS RETURNED. HE IS LEAVING IN A [place]. HE WOULD LIKE TO HAVE SOMETHING FOR IT, [facility]. HE WOULD LIKE A CALL BACK. THANK YOU.

## 2025-03-20 DIAGNOSIS — R39.9 UTI SYMPTOMS: Primary | ICD-10-CM

## 2025-03-20 RX ORDER — CIPROFLOXACIN 500 MG/1
500 TABLET ORAL 2 TIMES DAILY
Qty: 14 TABLET | Refills: 0 | Status: SHIPPED | OUTPATIENT
Start: 2025-03-20 | End: 2025-03-27

## 2025-03-23 LAB
APPEARANCE UR: CLEAR
BACTERIA #/AREA URNS HPF: ABNORMAL /HPF
BACTERIA UR CULT: ABNORMAL
BACTERIA UR CULT: ABNORMAL
BILIRUB UR QL STRIP: NEGATIVE
COLOR UR: YELLOW
GLUCOSE UR QL STRIP: NEGATIVE
HGB UR QL STRIP: NEGATIVE
HYALINE CASTS #/AREA URNS LPF: ABNORMAL /LPF
KETONES UR QL STRIP: NEGATIVE
LEUKOCYTE ESTERASE UR QL STRIP: ABNORMAL
NITRITE UR QL STRIP: POSITIVE
PH UR STRIP: 7 [PH] (ref 5–8)
PROT UR QL STRIP: NEGATIVE
RBC #/AREA URNS HPF: ABNORMAL /HPF
SERVICE CMNT-IMP: ABNORMAL
SP GR UR STRIP: 1 (ref 1–1.03)
SQUAMOUS #/AREA URNS HPF: ABNORMAL /HPF
WBC #/AREA URNS HPF: ABNORMAL /HPF

## 2025-03-24 ENCOUNTER — TELEPHONE (OUTPATIENT)
Dept: CARDIOLOGY | Facility: CLINIC | Age: 80
End: 2025-03-24
Payer: MEDICARE

## 2025-03-24 NOTE — TELEPHONE ENCOUNTER
Pt called in, states having nausea and  abdominal cramps for a few weeks possibly feels like an ulcer. He was put on Plavix, pt is concerned that he is unable to tolerate med.     Please Advise.

## 2025-03-24 NOTE — TELEPHONE ENCOUNTER
Pt informed and understood. Suggested primary care provider to see if they can look further into the possible ulcer.

## 2025-04-14 ENCOUNTER — CLINICAL SUPPORT (OUTPATIENT)
Dept: CARDIAC REHAB | Facility: HOSPITAL | Age: 80
End: 2025-04-14
Payer: MEDICARE

## 2025-04-14 VITALS
BODY MASS INDEX: 22.81 KG/M2 | SYSTOLIC BLOOD PRESSURE: 145 MMHG | WEIGHT: 153.99 LBS | OXYGEN SATURATION: 97 % | HEIGHT: 69 IN | DIASTOLIC BLOOD PRESSURE: 77 MMHG

## 2025-04-14 DIAGNOSIS — I25.10 CORONARY ARTERY DISEASE INVOLVING NATIVE CORONARY ARTERY OF NATIVE HEART WITHOUT ANGINA PECTORIS: ICD-10-CM

## 2025-04-14 DIAGNOSIS — Z95.5 S/P DRUG ELUTING CORONARY STENT PLACEMENT: ICD-10-CM

## 2025-04-14 ASSESSMENT — PATIENT HEALTH QUESTIONNAIRE - PHQ9
9. THOUGHTS THAT YOU WOULD BE BETTER OFF DEAD, OR OF HURTING YOURSELF: NOT AT ALL
5. POOR APPETITE OR OVEREATING: NOT AT ALL
2. FEELING DOWN, DEPRESSED OR HOPELESS: NOT AT ALL
SUM OF ALL RESPONSES TO PHQ QUESTIONS 1-9: 0
3. TROUBLE FALLING OR STAYING ASLEEP OR SLEEPING TOO MUCH: NOT AT ALL
8. MOVING OR SPEAKING SO SLOWLY THAT OTHER PEOPLE COULD HAVE NOTICED. OR THE OPPOSITE, BEING SO FIGETY OR RESTLESS THAT YOU HAVE BEEN MOVING AROUND A LOT MORE THAN USUAL: NOT AT ALL
7. TROUBLE CONCENTRATING ON THINGS, SUCH AS READING THE NEWSPAPER OR WATCHING TELEVISION: NOT AT ALL
6. FEELING BAD ABOUT YOURSELF - OR THAT YOU ARE A FAILURE OR HAVE LET YOURSELF OR YOUR FAMILY DOWN: NOT AT ALL
SUM OF ALL RESPONSES TO PHQ9 QUESTIONS 1 & 2: 0
1. LITTLE INTEREST OR PLEASURE IN DOING THINGS: NOT AT ALL
SUM OF ALL RESPONSES TO PHQ QUESTIONS 1-9: 0
4. FEELING TIRED OR HAVING LITTLE ENERGY: NOT AT ALL

## 2025-04-14 ASSESSMENT — DUKE ACTIVITY SCORE INDEX (DASI)
CAN YOU DO LIGHT WORK AROUND THE HOUSE LIKE DUSTING OR WASHING DISHES: YES
DASI METS SCORE: 9
CAN YOU WALK A BLOCK OR TWO ON LEVEL GROUND: YES
CAN YOU HAVE SEXUAL RELATIONS: YES
CAN YOU DO HEAVY WORK AROUND THE HOUSE LIKE SCRUBBING FLOORS OR LIFTING AND MOVING HEAVY FURNITURE: YES
CAN YOU WALK INDOORS, SUCH AS AROUND YOUR HOUSE: YES
CAN YOU CLIMB A FLIGHT OF STAIRS OR WALK UP A HILL: YES
CAN YOU PARTICIPATE IN STRENOUS SPORTS LIKE SWIMMING, SINGLES TENNIS, FOOTBALL, BASKETBALL, OR SKIING: NO
CAN YOU TAKE CARE OF YOURSELF (EAT, DRESS, BATHE, OR USE TOILET): YES
TOTAL_SCORE: 50.7
CAN YOU RUN A SHORT DISTANCE: YES
CAN YOU PARTICIPATE IN MODERATE RECREATIONAL ACTIVITIES LIKE GOLF, BOWLING, DANCING, DOUBLES TENNIS OR THROWING A BASEBALL OR FOOTBALL: YES
CAN YOU DO YARD WORK LIKE RAKING LEAVES, WEEDING OR PUSHING A MOWER: YES
CAN YOU DO MODERATE WORK AROUND THE HOUSE LIKE VACUUMING, SWEEPING FLOORS OR CARRYING GROCERIES: YES

## 2025-04-14 NOTE — PROGRESS NOTES
Cardiac Rehabilitation Initial Treatment Plan    Name: Gomez Abreu  Medical Record Number: [id number]  YOB: 1945  Age: 79 y.o.    Today’s Date: 4/14/2025  Primary Care Physician: Sundar Moran PA-C  Referring Physician: Janeth Caro APRN-CN*, Kenn Baig MD  Program Location: [address] Start Date :     General  Primary Diagnosis:   1. Coronary artery disease involving native coronary artery of native heart without angina pectoris  Referral to Cardiac Rehab - outpatient (for providers who will be following patient along cardiac rehab)      2. S/P drug eluting coronary stent placement  Referral to Cardiac Rehab - outpatient (for providers who will be following patient along cardiac rehab)         Onset/Date of Diagnosis: 02/24/2025    Initial Assessment, not yet started program.    AACVPR Risk Stratification: Moderate    Falls Risk: Low age > 65  Psychosocial Assessment     Pre PHQ-9:ZERO  Post PHQ-9:  Sent PH-Q 9 to MD if score > 20: NO; score <20    Pt reported/currently experiencing stress: No  Patient uses stress management skills: Yes   History of: no history of anxiety or depression  Currently seeing a mental health provider: No  Social Support: Yes, Whom: Family  Quality of Life Survey: SF-36   SF-36 Pre Post   Physical Component Score 48.15 TBD   Mental Component Score 61.35 TBD     Learning Assessment:  Learning assessment/barriers: None  Preferred learning method:  All  Barriers: None  Comments: None    Stages of Change: Action    Psychosocial Plan    Goal Status: Initial Assessment; goals not yet started    Psychosocial Goals:   1. Maintain or decrease PHQ-9 score of Zero by discharge.  2. Improve SF36 scores by discharge. Will retake prior to discharge.  3. Question patient on new or current psychological issues at least every 30 days.  4. Educate patient on Stress Management skills and apply them to reported stressors while in the program.    Psychosocial  "Interventions/Education:   Provide one on one emotional support as needed.   Current PQ9 score at Zero. 4/14  Patient reports current Emotional and Stress level at 1-2. 4/14    Nutrition Assessment:    Hyperlipidemia: Yes     Lipids:   Lab Results   Component Value Date    CHOL 185 12/09/2024    HDL 46.0 12/09/2024    LDLF 146 (H) 11/14/2022    TRIG 145 12/09/2024       Current Dietary Guidelines:  PYP score  of 72 A healthful dietary patytern, although there may be some specific behaviors that could be improved.  Barriers to dietary change: no    Diet Habit Survey: Picture Your Plate  Pre:  72/96  Post: To be done at discharge.    Diabetes Assessment    Lab Results   Component Value Date    HGBA1C 5.2 02/24/2024       History of Diabetes: No    Weight Management    Height: 69.5\"  Weight LBS: PRE: 154 POST:  Change:   BMI (Calculated): 22.4  Waist Circumference: PRE: 32\"  POST:  Change:    Nutrition Plan    Goal Status: Initial Assessment; goals not yet started    Nutrition Goals:   Maintain or Improve your \"Picture Your Plate Score\" of 72 by 3 points by discharge.  Lose .5 to1 inch from waist by discharge ( 32 inches on admit).  Provide education on nutritional aspects related to cardiac health and discuss dietary educational topics while in the program.  Educate patient on their admitting Diagnosis of CAD and Drug eluting Stents, and their medical HX and how they are relevant to their health.     Nutrition Interventions/Education:   1.  Patient has a HX of HTN, Hyperlipidemia, CP, Pacer, CHF,and Heart Murmur.  2.  Discuss \"Picture Your Plate Score\" within the first 6 weeks of program.    Exercise Assessment    Home Exercise: None  Mode:   Frequency:   Duration:     Exercise Prescription    DASI: TL SCORE:  50.7    MET Score: 9.0    6MWT: Yes  Pre Test O2 Sat/HR: 97/60  6 Minute O2 Sat/HR: 98/76  Distance Walked(ft): Pre: 1440  Post:  Christina Dyspnea: 0  Christina PRE: 0  Post Test O2 Sat/HR: 97/62  Adverse Reactions: " none     Frequency:  3 days/week   Mode: Aerobic   Duration: >30 total aerobic minutes   Intensity: RPE <15              Target HR:  Rest + 30  MAX 6MW MET Level:  PRE: 3.09   POST:  Patient wears supplemental O2: No   Current Max Exercise Mets     Modality METs Workload LVL Duration (minutes)   1 Pre-Exercise       2 Warm Up        3 NuStep 3.0 26 Roe  2 12 :00   4 Recumbent Bike 3.0 21 Roe  2 12 :00   5 Treadmill 3.0 2.6 MPH   12 :00   6 Weights    5:00   7 Cool Down       8 Post-Exercise         Maintain Heart Rate at 80-90% of Maximum for patients age 89.  Resistance Training: To start on patients 9th visit.  Home Exercise Prescription given: To be given prior to discharge from program.    Exercise Plan    Goal Status: Initial Assessment; goals not yet started    Exercise Goals:   Improve post 6MW by discharge.  Increase Mets to 4.0 by the end of the program.  Start exercise program at home second week into the program.  Gain independence and confidence with exercise while in the program.   Have a plan for continuing exercise after completion of program.    Exercise Interventions/Education:   What exactly are METs handout provided and discussed Verbalized understanding. 4/14  [place] exertion handout provided and discussed patient verbalized understanding. 4/14  Discussed the flow of exercise class and that he would have three BP's taken and wear telemetry while exercising patient verbalized understanding. 4/14  Increase METs by 1.0 every weeks or as tolerated.  Patient is not doing any home exercises. We had a discussion on the importance of exercising at home.     Other Core Components/Risk Factor Assessment:    Medication adherence  Current Medications:   Medication Documentation Review Audit       Reviewed by John Baig MD (Physician) on 03/10/25 at 1539      Medication Order Taking? Sig Documenting Provider Last Dose Status   albuterol 90 mcg/actuation inhaler [id number]  Inhale 2 puffs  every 6 hours if needed for wheezing or shortness of breath.   Patient not taking: Reported on 2025    Historical Provider, MD   10/23/24 2359   alpha tocopherol (Vitamin E) 268 mg (400 unit) capsule [id number] Yes Take 1 capsule (400 Units) by mouth once daily. Historical Provider, MD  Active   amLODIPine (Norvasc) 5 mg tablet [id number]  Take 1 tablet (5 mg) by mouth once daily.   Patient not taking: Reported on 3/10/2025    Historical Provider, MD  Active   ASCORBIC ACID, VITAMIN C, ORAL [id number] Yes Take 500 mg by mouth twice a day. Historical Provider, MD  Active   aspirin 81 mg EC tablet [id number] Yes Take 1 tablet (81 mg) by mouth once daily. Historical Provider, MD  Active   b complex vitamins capsule [id number]  Take 1 capsule by mouth once daily.   Patient not taking: Reported on 3/10/2025    Historical Provider, MD  Active   calcium citrate (Calcitrate) 100 mg (475 mg) split tablet [id number] Yes Take 2 half tablet (950 mg) by mouth once daily. Historical Provider, MD  Active   catheter (Bardia Red Rubber Catheter) 14 Fr [facility] [phone]  USE PRN FOR URINARY RETENTION   Patient not taking: Reported on 3/10/2025    Tang Anguiano MD  Active   cephalexin (Keflex) 500 mg capsule [id number]  Take 1 capsule (500 mg) by mouth 4 times a day for 7 days. Take with food.   Patient not taking: Reported on 3/10/2025    Whitney Mcneal, APRN-CNP   25 2359   cholecalciferol (Vitamin D-3) 5,000 Units tablet [id number]  Take 0.5 tablets (2,500 Units) by mouth twice a day.   Patient not taking: Reported on 3/10/2025    Historical Provider, MD  Active   clopidogrel (Plavix) 75 mg tablet [id number] Yes Take by mouth once daily. Historical Provider, MD  Active   ezetimibe (Zetia) 10 mg tablet [id number] Yes Take 1 tablet (10 mg) by mouth once daily. Historical Provider, MD  Active   famotidine (Pepcid) 20 mg tablet [id number]  Take 1 tablet (20 mg) by mouth once daily.   Patient not taking: Reported on 3/10/2025     Historical MD Vanessa  Active   isosorbide mononitrate ER (Imdur) 30 mg 24 hr tablet [id number]  Take 1 tablet (30 mg) by mouth once daily.   Patient not taking: Reported on 3/10/2025    Nadia Mendez MD   10/23/24 2359   magnesium 250 mg tablet [id number]  Take 1 tablet (250 mg) by mouth once daily.   Patient not taking: Reported on 3/10/2025    Historical MD Vanessa  Active   nitroglycerin (Nitrostat) 0.4 mg SL tablet [id number]  Place 1 tablet (0.4 mg) under the tongue every 5 minutes if needed for chest pain.   Patient not taking: Reported on 3/10/2025    Historical MD Vanessa  Active   omega 3-dha-epa-fish oil (Fish OiL) 159-249-254-600 mg capsule,delayed release(DR/EC) [id number]  Take 1 capsule by mouth once daily.   Patient not taking: Reported on 3/10/2025    Historical MD Vanessa  Active   pantoprazole (ProtoNix) 20 mg EC tablet [id number] Yes Take 1 tablet (20 mg) by mouth once daily in the morning. Take before meals. 10mg qd  Do not crush, chew, or split. Historical MD Vanessa  Active   prochlorperazine (Compazine) 10 mg tablet [id number]  Take 0.5 tablets (5 mg) by mouth every 6 hours if needed for nausea or vomiting.   Patient not taking: Reported on 3/10/2025    Devin Estrada DO  Active   ranolazine (Ranexa) 500 mg 12 hr tablet [id number]  Take 1 tablet (500 mg) by mouth every 12 hours.   Patient not taking: Reported on 3/10/2025    Nadia Mendez MD  Active   sulfamethoxazole-trimethoprim (Bactrim DS) 800-160 mg tablet [id number]  Take 1 tablet by mouth 2 times a day for 5 days. Sundar Moran PA-C   25 235   tamsulosin (Flomax) 0.4 mg 24 hr capsule [id number] Yes TAKE 1 CAPSULE AT BEDTIME Tang Anguiano MD  Active   VITAMIN A ORAL [id number]  Take 2,400 Units by mouth once daily.   Patient not taking: Reported on 3/10/2025    Nadia Mendez MD  Active   zinc gluconate 50 mg tablet [id number] Yes Take 1 tablet (50 mg) by mouth once daily. Historical Provider, MD  Active                                  Medication compliance: Yes   Uses pill box/organizer: Yes    Carries medication list: Yes     Blood Pressure Management  History of Hypertension: Yes   Medication Changes: No   Resting BP:   145/77    Heart Failure Management  Hx of Heart Failure: NO    Smoking/Tobacco Assessment  Social History     Tobacco Use   Smoking Status Never   Smokeless Tobacco Never       Other Core Component Plan    Goal Status: Initial Assessment; goals not yet started  Other Core Component Goals:   Increase knowledge test score from 13 out of 15 to 15 out of 15 by discharge.  Maintain Resting blood pressure of <130/80 while in the program.  Provide Cardiac book” Living Well with Heart Disease” and work book before patients 5th visit.    Other Core Component Interventions/Education:   Monitor Blood pressure pre, during, and post workout.  Discuss knowledge quiz results within first 6 weeks of program.  Make sure patient is compliant with their medications.  Make sure patient continues to carry updated medication list and the importance of maintaining one.   Current BP at 145/77. 4/14  Patient is compliant with their medication and carries updated medication list. 4/14    Individual Patient Goals:    Build Stamina  Get stronger and start exercising     Goal Status: Initial Assessment; goals not yet started    Staff Comments:  Initial patient assessment. Patient oriented to the 1:30 pm Class time. Patient will begin Rehab on , 2025. Patient completed 6MW with 1440 ft. and 3.09 METS he had a steady gait. Patient reported no fatigue. He completed all required surveys.    Rehab Staff Signature: Marian Hernandez, CV,RRT, RCP

## 2025-04-15 ENCOUNTER — APPOINTMENT (OUTPATIENT)
Dept: URBAN - METROPOLITAN AREA CLINIC 204 | Age: 80
Setting detail: DERMATOLOGY
End: 2025-04-15

## 2025-04-15 DIAGNOSIS — D49.2 NEOPLASM OF UNSPECIFIED BEHAVIOR OF BONE, SOFT TISSUE, AND SKIN: ICD-10-CM

## 2025-04-15 DIAGNOSIS — L82.0 INFLAMED SEBORRHEIC KERATOSIS: ICD-10-CM

## 2025-04-15 DIAGNOSIS — L40.0 PSORIASIS VULGARIS: ICD-10-CM

## 2025-04-15 PROCEDURE — OTHER LIQUID NITROGEN: OTHER

## 2025-04-15 PROCEDURE — OTHER MIPS QUALITY: OTHER

## 2025-04-15 PROCEDURE — OTHER BIOPSY BY SHAVE METHOD: OTHER

## 2025-04-15 PROCEDURE — OTHER PRESCRIPTION: OTHER

## 2025-04-15 PROCEDURE — OTHER PRESCRIPTION MEDICATION MANAGEMENT: OTHER

## 2025-04-15 PROCEDURE — OTHER COUNSELING: OTHER

## 2025-04-15 PROCEDURE — 99213 OFFICE O/P EST LOW 20 MIN: CPT | Mod: 25

## 2025-04-15 PROCEDURE — 17110 DESTRUCT B9 LESION 1-14: CPT

## 2025-04-15 PROCEDURE — 11102 TANGNTL BX SKIN SINGLE LES: CPT | Mod: 59

## 2025-04-15 RX ORDER — TRIAMCINOLONE ACETONIDE 1 MG/G
CREAM TOPICAL TWICE A DAY
Qty: 80 | Refills: 0 | Status: ERX | COMMUNITY
Start: 2025-04-15

## 2025-04-15 ASSESSMENT — LOCATION DETAILED DESCRIPTION DERM
LOCATION DETAILED: LEFT MID-UPPER BACK
LOCATION DETAILED: LEFT LATERAL ZYGOMA
LOCATION DETAILED: RIGHT ELBOW
LOCATION DETAILED: LEFT ELBOW

## 2025-04-15 ASSESSMENT — LOCATION ZONE DERM
LOCATION ZONE: ARM
LOCATION ZONE: TRUNK
LOCATION ZONE: FACE

## 2025-04-15 ASSESSMENT — BSA PSORIASIS: % BODY COVERED IN PSORIASIS: 0

## 2025-04-15 ASSESSMENT — LOCATION SIMPLE DESCRIPTION DERM
LOCATION SIMPLE: LEFT ZYGOMA
LOCATION SIMPLE: RIGHT ELBOW
LOCATION SIMPLE: LEFT ELBOW
LOCATION SIMPLE: LEFT UPPER BACK

## 2025-04-15 NOTE — PROCEDURE: BIOPSY BY SHAVE METHOD
Detail Level: Detailed
Depth Of Biopsy: dermis
Was A Bandage Applied: Yes
Size Of Lesion In Cm: 0.6
X Size Of Lesion In Cm: 0
Additional Anticipated Plans: If malignant consider Mohs surgery
Biopsy Type: H and E
Biopsy Method: double edge Personna blade
Anesthesia Type: 1% lidocaine with epinephrine and a 1:10 solution of 8.4% sodium bicarbonate
Anesthesia Volume In Cc: 0.5
Hemostasis: TCA 30%
Wound Care: Petrolatum
Dressing: bandage
Destruction After The Procedure: No
Type Of Destruction Used: Electrodesiccation and Curettage
Curettage Text: The wound bed was treated with curettage after the biopsy was performed.
Cryotherapy Text: The wound bed was treated with cryotherapy after the biopsy was performed.
Electrodesiccation Text: The wound bed was treated with electrodesiccation after the biopsy was performed.
Electrodesiccation And Curettage Text: The wound bed was treated with electrodesiccation and curettage after the biopsy was performed.
Silver Nitrate Text: The wound bed was treated with silver nitrate after the biopsy was performed.
Lab: [id number]
Lab Facility: [id number]
Medical Necessity Information: It is in your best interest to select a reason for this procedure from the list below. All of these items fulfill various CMS LCD requirements except the new and changing color options.
Consent: Written consent was obtained and risks were reviewed including but not limited to scarring, infection, bleeding, scabbing, incomplete removal, nerve damage and allergy to anesthesia.
Post-Care Instructions: I reviewed with the patient in detail post-care instructions. Patient is to keep the biopsy site dry overnight, and then apply bacitracin twice daily until healed. Patient may apply hydrogen peroxide soaks to remove any crusting.
Notification Instructions: Patient will be notified of biopsy results. However, patient instructed to call the office if not contacted within 2 weeks.
Billing Type: Third-Party Bill
Information: Selecting Yes will display possible errors in your note based on the variables you have selected. This validation is only offered as a suggestion for you. PLEASE NOTE THAT THE VALIDATION TEXT WILL BE REMOVED WHEN YOU FINALIZE YOUR NOTE. IF YOU WANT TO FAX A PRELIMINARY NOTE YOU WILL NEED TO TOGGLE THIS TO 'NO' IF YOU DO NOT WANT IT IN YOUR FAXED NOTE.

## 2025-04-15 NOTE — PROCEDURE: LIQUID NITROGEN
Detail Level: Detailed
Include Z78.9 (Other Specified Conditions Influencing Health Status) As An Associated Diagnosis?: Yes
Spray Paint Technique: No
Spray Paint Text: The liquid nitrogen was applied to the skin utilizing a spray paint frosting technique.
Post-Care Instructions: I reviewed with the patient in detail post-care instructions. Patient is to wear sunprotection, and avoid picking at any of the treated lesions. Pt may apply Vaseline to crusted or scabbing areas.
Consent: The patient's consent was obtained including but not limited to risks of crusting, scabbing, blistering, scarring, darker or lighter pigmentary change, recurrence, incomplete removal and infection.
Medical Necessity Clause: This procedure was medically necessary because the lesions that were treated were:
Medical Necessity Information: It is in your best interest to select a reason for this procedure from the list below. All of these items fulfill various CMS LCD requirements except the new and changing color options.

## 2025-04-15 NOTE — PROCEDURE: PRESCRIPTION MEDICATION MANAGEMENT
Detail Level: Zone
Continue Regimen: Triamcinolone cream spot treat
Render In Strict Bullet Format?: No

## 2025-04-21 ENCOUNTER — CLINICAL SUPPORT (OUTPATIENT)
Dept: CARDIAC REHAB | Facility: HOSPITAL | Age: 80
End: 2025-04-21
Payer: MEDICARE

## 2025-04-21 VITALS — SYSTOLIC BLOOD PRESSURE: 134 MMHG | DIASTOLIC BLOOD PRESSURE: 70 MMHG

## 2025-04-21 DIAGNOSIS — I25.10 CORONARY ARTERY DISEASE INVOLVING NATIVE CORONARY ARTERY OF NATIVE HEART WITHOUT ANGINA PECTORIS: Primary | ICD-10-CM

## 2025-04-21 DIAGNOSIS — Z95.5 S/P DRUG ELUTING CORONARY STENT PLACEMENT: ICD-10-CM

## 2025-04-21 PROCEDURE — 93798 PHYS/QHP OP CAR RHAB W/ECG: CPT | Performed by: STUDENT IN AN ORGANIZED HEALTH CARE EDUCATION/TRAINING PROGRAM

## 2025-04-21 PROCEDURE — 94618 PULMONARY STRESS TESTING: CPT | Performed by: STUDENT IN AN ORGANIZED HEALTH CARE EDUCATION/TRAINING PROGRAM

## 2025-04-23 ENCOUNTER — CLINICAL SUPPORT (OUTPATIENT)
Dept: CARDIAC REHAB | Facility: HOSPITAL | Age: 80
End: 2025-04-23
Payer: MEDICARE

## 2025-04-23 VITALS — SYSTOLIC BLOOD PRESSURE: 150 MMHG | DIASTOLIC BLOOD PRESSURE: 71 MMHG

## 2025-04-23 DIAGNOSIS — Z95.5 S/P DRUG ELUTING CORONARY STENT PLACEMENT: ICD-10-CM

## 2025-04-23 DIAGNOSIS — I25.10 CORONARY ARTERY DISEASE INVOLVING NATIVE CORONARY ARTERY OF NATIVE HEART WITHOUT ANGINA PECTORIS: Primary | ICD-10-CM

## 2025-04-23 PROCEDURE — 93798 PHYS/QHP OP CAR RHAB W/ECG: CPT | Performed by: STUDENT IN AN ORGANIZED HEALTH CARE EDUCATION/TRAINING PROGRAM

## 2025-04-25 ENCOUNTER — CLINICAL SUPPORT (OUTPATIENT)
Dept: CARDIAC REHAB | Facility: HOSPITAL | Age: 80
End: 2025-04-25
Payer: MEDICARE

## 2025-04-25 DIAGNOSIS — Z95.5 S/P DRUG ELUTING CORONARY STENT PLACEMENT: ICD-10-CM

## 2025-04-25 DIAGNOSIS — I25.10 CORONARY ARTERY DISEASE INVOLVING NATIVE CORONARY ARTERY OF NATIVE HEART WITHOUT ANGINA PECTORIS: Primary | ICD-10-CM

## 2025-04-25 PROCEDURE — 93798 PHYS/QHP OP CAR RHAB W/ECG: CPT | Performed by: STUDENT IN AN ORGANIZED HEALTH CARE EDUCATION/TRAINING PROGRAM

## 2025-04-28 ENCOUNTER — CLINICAL SUPPORT (OUTPATIENT)
Dept: CARDIAC REHAB | Facility: HOSPITAL | Age: 80
End: 2025-04-28
Payer: MEDICARE

## 2025-04-28 VITALS — DIASTOLIC BLOOD PRESSURE: 61 MMHG | SYSTOLIC BLOOD PRESSURE: 119 MMHG

## 2025-04-28 DIAGNOSIS — Z95.5 S/P DRUG ELUTING CORONARY STENT PLACEMENT: ICD-10-CM

## 2025-04-28 DIAGNOSIS — I25.10 CORONARY ARTERY DISEASE INVOLVING NATIVE CORONARY ARTERY OF NATIVE HEART WITHOUT ANGINA PECTORIS: Primary | ICD-10-CM

## 2025-04-28 PROCEDURE — 93798 PHYS/QHP OP CAR RHAB W/ECG: CPT | Performed by: STUDENT IN AN ORGANIZED HEALTH CARE EDUCATION/TRAINING PROGRAM

## 2025-04-30 ENCOUNTER — CLINICAL SUPPORT (OUTPATIENT)
Dept: CARDIAC REHAB | Facility: HOSPITAL | Age: 80
End: 2025-04-30
Payer: MEDICARE

## 2025-04-30 VITALS — SYSTOLIC BLOOD PRESSURE: 141 MMHG | DIASTOLIC BLOOD PRESSURE: 66 MMHG

## 2025-04-30 DIAGNOSIS — I25.10 CORONARY ARTERY DISEASE INVOLVING NATIVE CORONARY ARTERY OF NATIVE HEART WITHOUT ANGINA PECTORIS: Primary | ICD-10-CM

## 2025-04-30 DIAGNOSIS — Z95.2 S/P TAVR (TRANSCATHETER AORTIC VALVE REPLACEMENT): ICD-10-CM

## 2025-04-30 PROCEDURE — 93798 PHYS/QHP OP CAR RHAB W/ECG: CPT | Performed by: STUDENT IN AN ORGANIZED HEALTH CARE EDUCATION/TRAINING PROGRAM

## 2025-05-01 ENCOUNTER — TELEPHONE (OUTPATIENT)
Dept: CARDIOLOGY | Facility: CLINIC | Age: 80
End: 2025-05-01
Payer: MEDICARE

## 2025-05-01 NOTE — TELEPHONE ENCOUNTER
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  He is wondering about holding his Plavix and Baby Asprin.  The hospital said that he should not stop talking them but when they did the shave biopsy he was still bleeding 2 days later.  He wants your opinion of stopping these and if she for how long?  
alert

## 2025-05-02 ENCOUNTER — CLINICAL SUPPORT (OUTPATIENT)
Dept: CARDIAC REHAB | Facility: HOSPITAL | Age: 80
End: 2025-05-02
Payer: MEDICARE

## 2025-05-02 DIAGNOSIS — I25.10 CORONARY ARTERY DISEASE INVOLVING NATIVE CORONARY ARTERY OF NATIVE HEART WITHOUT ANGINA PECTORIS: Primary | ICD-10-CM

## 2025-05-02 DIAGNOSIS — Z95.5 S/P DRUG ELUTING CORONARY STENT PLACEMENT: ICD-10-CM

## 2025-05-02 PROCEDURE — 93798 PHYS/QHP OP CAR RHAB W/ECG: CPT | Performed by: STUDENT IN AN ORGANIZED HEALTH CARE EDUCATION/TRAINING PROGRAM

## 2025-05-05 ENCOUNTER — CLINICAL SUPPORT (OUTPATIENT)
Dept: CARDIAC REHAB | Facility: HOSPITAL | Age: 80
End: 2025-05-05
Payer: MEDICARE

## 2025-05-05 DIAGNOSIS — I25.10 CORONARY ARTERY DISEASE INVOLVING NATIVE CORONARY ARTERY OF NATIVE HEART WITHOUT ANGINA PECTORIS: Primary | ICD-10-CM

## 2025-05-05 DIAGNOSIS — Z95.5 S/P DRUG ELUTING CORONARY STENT PLACEMENT: ICD-10-CM

## 2025-05-05 PROCEDURE — 93798 PHYS/QHP OP CAR RHAB W/ECG: CPT | Performed by: STUDENT IN AN ORGANIZED HEALTH CARE EDUCATION/TRAINING PROGRAM

## 2025-05-07 ENCOUNTER — CLINICAL SUPPORT (OUTPATIENT)
Dept: CARDIAC REHAB | Facility: HOSPITAL | Age: 80
End: 2025-05-07
Payer: MEDICARE

## 2025-05-07 VITALS — DIASTOLIC BLOOD PRESSURE: 60 MMHG | SYSTOLIC BLOOD PRESSURE: 128 MMHG

## 2025-05-07 DIAGNOSIS — Z95.5 S/P DRUG ELUTING CORONARY STENT PLACEMENT: ICD-10-CM

## 2025-05-07 DIAGNOSIS — I25.10 CORONARY ARTERY DISEASE INVOLVING NATIVE CORONARY ARTERY OF NATIVE HEART WITHOUT ANGINA PECTORIS: Primary | ICD-10-CM

## 2025-05-07 PROCEDURE — 93798 PHYS/QHP OP CAR RHAB W/ECG: CPT | Performed by: STUDENT IN AN ORGANIZED HEALTH CARE EDUCATION/TRAINING PROGRAM

## 2025-05-09 ENCOUNTER — CLINICAL SUPPORT (OUTPATIENT)
Dept: CARDIAC REHAB | Facility: HOSPITAL | Age: 80
End: 2025-05-09
Payer: MEDICARE

## 2025-05-09 VITALS — SYSTOLIC BLOOD PRESSURE: 134 MMHG | DIASTOLIC BLOOD PRESSURE: 68 MMHG

## 2025-05-09 DIAGNOSIS — Z95.5 S/P DRUG ELUTING CORONARY STENT PLACEMENT: ICD-10-CM

## 2025-05-09 DIAGNOSIS — I25.10 CORONARY ARTERY DISEASE INVOLVING NATIVE CORONARY ARTERY OF NATIVE HEART WITHOUT ANGINA PECTORIS: Primary | ICD-10-CM

## 2025-05-09 PROCEDURE — 93798 PHYS/QHP OP CAR RHAB W/ECG: CPT | Performed by: STUDENT IN AN ORGANIZED HEALTH CARE EDUCATION/TRAINING PROGRAM

## 2025-05-12 ENCOUNTER — CLINICAL SUPPORT (OUTPATIENT)
Dept: CARDIAC REHAB | Facility: HOSPITAL | Age: 80
End: 2025-05-12
Payer: MEDICARE

## 2025-05-12 VITALS — DIASTOLIC BLOOD PRESSURE: 61 MMHG | SYSTOLIC BLOOD PRESSURE: 131 MMHG

## 2025-05-12 DIAGNOSIS — I25.10 CORONARY ARTERY DISEASE INVOLVING NATIVE CORONARY ARTERY OF NATIVE HEART WITHOUT ANGINA PECTORIS: Primary | ICD-10-CM

## 2025-05-12 DIAGNOSIS — Z95.5 S/P DRUG ELUTING CORONARY STENT PLACEMENT: ICD-10-CM

## 2025-05-12 PROCEDURE — 93798 PHYS/QHP OP CAR RHAB W/ECG: CPT | Performed by: STUDENT IN AN ORGANIZED HEALTH CARE EDUCATION/TRAINING PROGRAM

## 2025-05-14 ENCOUNTER — CLINICAL SUPPORT (OUTPATIENT)
Dept: CARDIAC REHAB | Facility: HOSPITAL | Age: 80
End: 2025-05-14
Payer: MEDICARE

## 2025-05-14 VITALS — DIASTOLIC BLOOD PRESSURE: 64 MMHG | SYSTOLIC BLOOD PRESSURE: 122 MMHG

## 2025-05-14 DIAGNOSIS — I25.10 CORONARY ARTERY DISEASE INVOLVING NATIVE CORONARY ARTERY OF NATIVE HEART WITHOUT ANGINA PECTORIS: Primary | ICD-10-CM

## 2025-05-14 DIAGNOSIS — Z95.5 S/P DRUG ELUTING CORONARY STENT PLACEMENT: ICD-10-CM

## 2025-05-14 PROCEDURE — 93798 PHYS/QHP OP CAR RHAB W/ECG: CPT | Performed by: STUDENT IN AN ORGANIZED HEALTH CARE EDUCATION/TRAINING PROGRAM

## 2025-05-16 ENCOUNTER — CLINICAL SUPPORT (OUTPATIENT)
Dept: CARDIAC REHAB | Facility: HOSPITAL | Age: 80
End: 2025-05-16
Payer: MEDICARE

## 2025-05-16 DIAGNOSIS — Z95.5 S/P DRUG ELUTING CORONARY STENT PLACEMENT: ICD-10-CM

## 2025-05-16 DIAGNOSIS — I25.10 CORONARY ARTERY DISEASE INVOLVING NATIVE CORONARY ARTERY OF NATIVE HEART WITHOUT ANGINA PECTORIS: Primary | ICD-10-CM

## 2025-05-16 PROCEDURE — 93798 PHYS/QHP OP CAR RHAB W/ECG: CPT | Performed by: STUDENT IN AN ORGANIZED HEALTH CARE EDUCATION/TRAINING PROGRAM

## 2025-05-19 ENCOUNTER — APPOINTMENT (OUTPATIENT)
Dept: CARDIAC REHAB | Facility: HOSPITAL | Age: 80
End: 2025-05-19
Payer: MEDICARE

## 2025-05-19 ENCOUNTER — APPOINTMENT (OUTPATIENT)
Dept: URBAN - METROPOLITAN AREA CLINIC 204 | Age: 80
Setting detail: DERMATOLOGY
End: 2025-05-19

## 2025-05-19 PROBLEM — C44.319 BASAL CELL CARCINOMA OF SKIN OF OTHER PARTS OF FACE: Status: ACTIVE | Noted: 2025-05-19

## 2025-05-19 PROCEDURE — OTHER RETURN TO REFERRING PROVIDER: OTHER

## 2025-05-19 PROCEDURE — 12052 INTMD RPR FACE/MM 2.6-5.0 CM: CPT

## 2025-05-19 PROCEDURE — OTHER MIPS QUALITY: OTHER

## 2025-05-19 PROCEDURE — OTHER CONSULTATION FOR MOHS SURGERY: OTHER

## 2025-05-19 PROCEDURE — OTHER MOHS SURGERY: OTHER

## 2025-05-19 PROCEDURE — 17311 MOHS 1 STAGE H/N/HF/G: CPT

## 2025-05-21 ENCOUNTER — APPOINTMENT (OUTPATIENT)
Dept: CARDIAC REHAB | Facility: HOSPITAL | Age: 80
End: 2025-05-21
Payer: MEDICARE

## 2025-05-23 ENCOUNTER — APPOINTMENT (OUTPATIENT)
Dept: CARDIAC REHAB | Facility: HOSPITAL | Age: 80
End: 2025-05-23
Payer: MEDICARE

## 2025-05-28 ENCOUNTER — CLINICAL SUPPORT (OUTPATIENT)
Dept: CARDIAC REHAB | Facility: HOSPITAL | Age: 80
End: 2025-05-28
Payer: MEDICARE

## 2025-05-28 DIAGNOSIS — Z95.5 S/P DRUG ELUTING CORONARY STENT PLACEMENT: ICD-10-CM

## 2025-05-28 DIAGNOSIS — I25.10 CORONARY ARTERY DISEASE INVOLVING NATIVE CORONARY ARTERY OF NATIVE HEART WITHOUT ANGINA PECTORIS: Primary | ICD-10-CM

## 2025-05-28 PROCEDURE — 93798 PHYS/QHP OP CAR RHAB W/ECG: CPT | Performed by: STUDENT IN AN ORGANIZED HEALTH CARE EDUCATION/TRAINING PROGRAM

## 2025-05-30 ENCOUNTER — CLINICAL SUPPORT (OUTPATIENT)
Dept: CARDIAC REHAB | Facility: HOSPITAL | Age: 80
End: 2025-05-30
Payer: MEDICARE

## 2025-05-30 VITALS — SYSTOLIC BLOOD PRESSURE: 123 MMHG | DIASTOLIC BLOOD PRESSURE: 62 MMHG

## 2025-05-30 DIAGNOSIS — I25.10 CORONARY ARTERY DISEASE INVOLVING NATIVE CORONARY ARTERY OF NATIVE HEART WITHOUT ANGINA PECTORIS: Primary | ICD-10-CM

## 2025-05-30 DIAGNOSIS — Z95.5 S/P DRUG ELUTING CORONARY STENT PLACEMENT: ICD-10-CM

## 2025-05-30 PROCEDURE — 93798 PHYS/QHP OP CAR RHAB W/ECG: CPT | Performed by: STUDENT IN AN ORGANIZED HEALTH CARE EDUCATION/TRAINING PROGRAM

## 2025-06-02 ENCOUNTER — CLINICAL SUPPORT (OUTPATIENT)
Dept: CARDIAC REHAB | Facility: HOSPITAL | Age: 80
End: 2025-06-02
Payer: MEDICARE

## 2025-06-02 DIAGNOSIS — Z95.5 S/P DRUG ELUTING CORONARY STENT PLACEMENT: ICD-10-CM

## 2025-06-02 DIAGNOSIS — I25.10 CORONARY ARTERY DISEASE INVOLVING NATIVE CORONARY ARTERY OF NATIVE HEART WITHOUT ANGINA PECTORIS: Primary | ICD-10-CM

## 2025-06-02 PROCEDURE — 93798 PHYS/QHP OP CAR RHAB W/ECG: CPT | Performed by: STUDENT IN AN ORGANIZED HEALTH CARE EDUCATION/TRAINING PROGRAM

## 2025-06-02 NOTE — PROGRESS NOTES
CT tech to bedside to complete screening for contrast injection.     Patient tolerated rehab well.  No problems noted.

## 2025-06-04 ENCOUNTER — CLINICAL SUPPORT (OUTPATIENT)
Dept: CARDIAC REHAB | Facility: HOSPITAL | Age: 80
End: 2025-06-04
Payer: MEDICARE

## 2025-06-04 VITALS — DIASTOLIC BLOOD PRESSURE: 62 MMHG | SYSTOLIC BLOOD PRESSURE: 112 MMHG

## 2025-06-04 DIAGNOSIS — I25.10 CORONARY ARTERY DISEASE INVOLVING NATIVE CORONARY ARTERY OF NATIVE HEART WITHOUT ANGINA PECTORIS: Primary | ICD-10-CM

## 2025-06-04 DIAGNOSIS — Z95.5 S/P DRUG ELUTING CORONARY STENT PLACEMENT: ICD-10-CM

## 2025-06-04 PROCEDURE — 93798 PHYS/QHP OP CAR RHAB W/ECG: CPT | Performed by: STUDENT IN AN ORGANIZED HEALTH CARE EDUCATION/TRAINING PROGRAM

## 2025-06-04 NOTE — PROGRESS NOTES
Cardiac Rehabilitation 60-Day Assessment    Name: Gomez Abreu  Medical Record Number: [id number]  YOB: 1945  Age: 79 y.o.    Today's Date: 6/2/2025   Primary Care Physician: Sundar Moran PA-C  Referring Physician: Janeth Caro APRN-CN*, Kenn Baig MD  Program Location: [address]   Exercise Start Date : 4/21/2025    General  Primary Diagnosis:   1. Coronary artery disease involving native coronary artery of native heart without angina pectoris  Referral to Cardiac Rehab - outpatient (for providers who will be following patient along cardiac rehab)      2. S/P drug eluting coronary stent placement  Referral to Cardiac Rehab - outpatient (for providers who will be following patient along cardiac rehab)         Onset/Date of Diagnosis: 02/24/2025    Session #: 15    AACVPR Risk Stratification: Moderate    Falls Risk: Low age > 65  Psychosocial Assessment     Pre PHQ-9: ZERO  Post PHQ-9:  Sent PH-Q 9 to MD if score > 20: NO; score <20    Pt reported/currently experiencing stress: No  Patient uses stress management skills: Yes   History of: no history of anxiety or depression  Currently seeing a mental health provider: No  Social Support: Yes, Whom: Family  Quality of Life Survey: SF-36   SF-36 Pre Post   Physical Component Score 48.15 TBD   Mental Component Score 61.35 TBD     Learning Assessment:  Learning assessment/barriers: None  Preferred learning method: All  Barriers: None  Comments: None    Stages of Change: Action    Psychosocial Plan    Goal Status: In progress    Psychosocial Goals:   1. Maintain or decrease PHQ-9 score of Zero by discharge.  2. Improve SF36 scores by discharge. Will retake prior to discharge.  3. Question patient on new or current psychological issues at least every 30 days.  4. Educate patient on Stress Management skills and apply them to reported stressors while in the program.    Psychosocial Interventions/Education:   Provide one on one emotional  "support as needed.   Current PQ9 score at Zero. 4/14  Patient reports current Emotional and Stress level at 1-2. 4/14  Quiz 6 5/7 Your Emotional Health no questions not completed.  Hand Out Stress Management Overview 8 Tips / Steps discussed verbalized understanding.5/7  Hand Out Stress Less for a Healthier Heart. 5/7  Questioned patient on new or current psychological issues, none to report at this time. 5/9  Stress level at Zero. 5/9  Questioned patient on new or current psychological issues, none to report at this time. 6/2  Stress level at 1. 6/2    Nutrition Assessment:    Hyperlipidemia: Yes     Lipids:   Lab Results   Component Value Date    CHOL 185 12/09/2024    HDL 46.0 12/09/2024    LDLF 146 (H) 11/14/2022    TRIG 145 12/09/2024       Current Dietary Guidelines: PYP score of 72 A healthful dietary patytern, although there may be some specific behaviors that could be improved.  Barriers to dietary change: no    Diet Habit Survey: Picture Your Plate  Pre: 72/96  Post: To be done at discharge.    Diabetes Assessment    Lab Results   Component Value Date    HGBA1C 5.2 02/24/2024       History of Diabetes: No    Weight Management    Height: 69.5\"  Weight LBS: PRE: 154 POST:  Change:   BMI (Calculated): 22.4  Waist Circumference: PRE: 32\"  POST:  Change:    Nutrition Plan    Goal Status: In progress    Nutrition Goals:   Maintain or improve your \"Picture Your Plate Score\" of 72 by 3 points by discharge.  Lose .5 to1 inch from waist by discharge (32 inches on admit).  Provide education on nutritional aspects related to cardiac health and discuss dietary educational topics while in the program.  Educate patient on their admitting Diagnosis of CAD and Drug eluting Stents, and their medical HX and how they are relevant to their health.     Nutrition Interventions/Education:   1.  Patient has a HX of HTN, Hyperlipidemia, CP, Pacer, CHF, and Heart Murmur.  2.  Discuss \"Picture Your Plate Score\" within the first 6 " "weeks of program.   3.  Quiz 2 5/7 Nutrition no questions not completed.  4. Discussed \"Picture Your Plate Scores\" and ways to improve scores. Verbalized understanding. 5/7  5. Hand Out Mediterranean Diet Overview, What and how much to Eat, Meals and Snacking, Resources to more information, and Shopping Ideas. Discussed and verbalized understanding. 5/7  6. Hand Out Tasting The Mediterranean Diet 8 Simple Steps discussed verbalized understanding. 5/7  7. Hand Out Heart Healthy Eating on a Budget. Discussed and verbalized understanding. 5/7  8. Hand Out Building a Heart Healthy Plate. Discussed and verbalized understanding. 5/7  9. Quiz 5 5/7 Managing specific risk factors High cholesterol ,Blood Pressure and Diabetes no questions not completed.   10. Hand Out Overview on Cholesterol discussed and verbalized understanding. 5/7  11. Hand Out Blood Pressure, Herbs and Spices instead of Salt discussed verbalized understanding. 5/7  12. Hand Out Consequences of High Blood Pressure discussed verbalized understanding. 5/7   13. Hand Out Understanding Diabetes, Take Diabetes to Heart discussed verbalized understanding. 5/7  14. Hand Out Diabetes Know your numbers discussed and verbalized understanding. 5/7  15. Hand Out ADA Food for Thought discussed verbalized understanding. 5/7  16. Hand Out High Cholesterol Overview discussed verbalized understanding. 5/7  17. Hand Out How to control Cholesterol discussed verbalized understanding. 5/7    Exercise Assessment    Home Exercise: Yes Walking Trails  Mode: Aerobic  Frequency: 3-4 Days/ WK  Duration: 1+ hours    Exercise Prescription    DASI: TL SCORE:  50.7    MET Score: 9.0    6MWT: Yes  Pre Test O2 Sat/HR: 97/60  6 Minute O2 Sat/HR: 98/76  Distance Walked(ft): Pre: 1440  Post:  Christina Dyspnea: 0  Christina PRE: 0  Post Test O2 Sat/HR: 97/62  Adverse Reactions: none     Frequency:  3 days/week   Mode: Aerobic   Duration: >30 total aerobic minutes   Intensity: RPE <15              " Target HR:  Rest + 30  MAX 6MW MET Level:  PRE: 3.09   POST:  Patient wears supplemental O2: No   Current Max Exercise Mets:      Modality METs Workload LVL Duration (minutes)   1 Pre-Exercise       2 Warm Up        3 NuStep 4.7 84 Roe  4 18 :00   4 Recumbent Bike     12 :00   5 Treadmill 4.0 2.54 MPH 3% 18 :00   6 Weights 3.9 4 LBS 10 5:00   7 Cool Down       8 Post-Exercise         Maintain Heart Rate at 80-90% of Maximum for patients age 89.  Resistance Training: Started on patients 9th visit.  Home Exercise Prescription given: To be given prior to discharge from program.    Exercise Plan    Goal Status: Initial Assessment; goals not yet started    Exercise Goals:   Improve post 6MW by discharge.  Increase Mets to 4.0 by the end of the program. Achieved 5/9  Start exercise program at home second week into the program.  Gain independence and confidence with exercise while in the program.   Have a plan for continuing exercise after completion of program.  New METs goal of 6.0 by the end of program. 5/9    Exercise Interventions/Education:   What exactly are METs handout provided and discussed Verbalized understanding. 4/14  [place] exertion handout provided and discussed patient verbalized understanding. 4/14  Discussed the flow of exercise class and that he would have three BP's taken and wear telemetry while exercising patient verbalized understanding. 4/14  Increase METs by 1.0 every weeks or as tolerated.  Patient is not doing any home exercises. We had a discussion on the importance of exercising at home.   Quiz 3  5/7 Exercising More no questions not completed.  Hand Out Aerobic Exercising Overview discussed verbalized understanding.  Hand Out Real - Life Benefits of Exercise and Physical Activity discussed verbalized understanding. 5/7  Hand Out Drinking Enough Fluids discussed verbalized understanding. 5/7  Hand Out 4 Types of Exercises to improve your discussed verbalized understanding. 5/7  Hand Out [facility]  Warm Ups/Cool downs and Strength Training Exercises that are displayed in class. 5/7  Achieved first METs goal of 4.0. 5/9  Exercising by walking on trails for 6 Days/ wk for 2+ hour at a time. 5/9  Current METS at 4.8. 5/9  Exercising by walking on trails for 3-4 Days/ wk for 1+ hour at a time.  6/2  Current METS at 4.7. 6/2    Other Core Components/Risk Factor Assessment:    Medication adherence  Current Medications:   Medication Documentation Review Audit       Reviewed by John Baig MD (Physician) on 03/10/25 at 1539      Medication Order Taking? Sig Documenting Provider Last Dose Status   albuterol 90 mcg/actuation inhaler [id number]  Inhale 2 puffs every 6 hours if needed for wheezing or shortness of breath.   Patient not taking: Reported on 2025    Historical MD Vanessa   10/23/24 2359   alpha tocopherol (Vitamin E) 268 mg (400 unit) capsule [id number] Yes Take 1 capsule (400 Units) by mouth once daily. Historical Provider, MD  Active   amLODIPine (Norvasc) 5 mg tablet [id number]  Take 1 tablet (5 mg) by mouth once daily.   Patient not taking: Reported on 3/10/2025    Historical MD Vanessa  Active   ASCORBIC ACID, VITAMIN C, ORAL [id number] Yes Take 500 mg by mouth twice a day. Historical Provider, MD  Active   aspirin 81 mg EC tablet [id number] Yes Take 1 tablet (81 mg) by mouth once daily. Historical Provider, MD  Active   b complex vitamins capsule [id number]  Take 1 capsule by mouth once daily.   Patient not taking: Reported on 3/10/2025    Historical MD Vanessa  Active   calcium citrate (Calcitrate) 100 mg (475 mg) split tablet [id number] Yes Take 2 half tablet (950 mg) by mouth once daily. Historical Provider, MD  Active   catheter (Bardia [employer]ber Catheter) 14 Fr [facility] [id number]  USE PRN FOR URINARY RETENTION   Patient not taking: Reported on 3/10/2025    Tang Anguiano MD  Active   cephalexin (Keflex) 500 mg capsule [id number]  Take 1 capsule (500 mg) by mouth 4 times a day for 7  days. Take with food.   Patient not taking: Reported on 3/10/2025    Whitney Mcneal, APRN-CNP   25 2359   cholecalciferol (Vitamin D-3) 5,000 Units tablet [id number]  Take 0.5 tablets (2,500 Units) by mouth twice a day.   Patient not taking: Reported on 3/10/2025    Historical Provider, MD  Active   clopidogrel (Plavix) 75 mg tablet [id number] Yes Take by mouth once daily. Historical Provider, MD  Active   ezetimibe (Zetia) 10 mg tablet [id number] Yes Take 1 tablet (10 mg) by mouth once daily. Historical Provider, MD  Active   famotidine (Pepcid) 20 mg tablet [id number]  Take 1 tablet (20 mg) by mouth once daily.   Patient not taking: Reported on 3/10/2025    Historical MD Vanessa  Active   isosorbide mononitrate ER (Imdur) 30 mg 24 hr tablet [id number]  Take 1 tablet (30 mg) by mouth once daily.   Patient not taking: Reported on 3/10/2025    Historical MD Vanessa   10/23/24 2359   magnesium 250 mg tablet [id number]  Take 1 tablet (250 mg) by mouth once daily.   Patient not taking: Reported on 3/10/2025    Historical Provider, MD  Active   nitroglycerin (Nitrostat) 0.4 mg SL tablet [id number]  Place 1 tablet (0.4 mg) under the tongue every 5 minutes if needed for chest pain.   Patient not taking: Reported on 3/10/2025    Historical MD Vanessa  Active   omega 3-dha-epa-fish oil (Fish OiL) [phone] mg capsule,delayed release(DR/EC) [id number]  Take 1 capsule by mouth once daily.   Patient not taking: Reported on 3/10/2025    Historical Provider, MD  Active   pantoprazole (ProtoNix) 20 mg EC tablet [id number] Yes Take 1 tablet (20 mg) by mouth once daily in the morning. Take before meals. 10mg qd  Do not crush, chew, or split. Historical Provider, MD  Active   prochlorperazine (Compazine) 10 mg tablet [id number]  Take 0.5 tablets (5 mg) by mouth every 6 hours if needed for nausea or vomiting.   Patient not taking: Reported on 3/10/2025    Devin Estrada DO  Active   ranolazine (Ranexa) 500 mg 12 hr tablet  [id number]  Take 1 tablet (500 mg) by mouth every 12 hours.   Patient not taking: Reported on 3/10/2025    Historical Provider, MD  Active   sulfamethoxazole-trimethoprim (Bactrim DS) 800-160 mg tablet [id number]  Take 1 tablet by mouth 2 times a day for 5 days. Sundar Moran PA-C   25 2359   tamsulosin (Flomax) 0.4 mg 24 hr capsule [id number] Yes TAKE 1 CAPSULE AT BEDTIME Tang Anguiano MD  Active   VITAMIN A ORAL [id number]  Take 2,400 Units by mouth once daily.   Patient not taking: Reported on 3/10/2025    Historical Provider, MD  Active   zinc gluconate 50 mg tablet [id number] Yes Take 1 tablet (50 mg) by mouth once daily. Historical Provider, MD  Active                                 Medication compliance: Yes   Uses pill box/organizer: Yes    Carries medication list: Yes     Blood Pressure Management  History of Hypertension: Yes   Medication Changes: No   Resting BP:   145/77    Heart Failure Management  Hx of Heart Failure: NO    Smoking/Tobacco Assessment  Social History     Tobacco Use   Smoking Status Never   Smokeless Tobacco Never       Other Core Component Plan    Goal Status: Initial Assessment; goals not yet started    Other Core Component Goals:   Increase knowledge test score from 13 out of 15 to 15 out of 15 by discharge.  Maintain Resting blood pressure of <130/80 while in the program.  Provide Cardiac book” Living Well with Heart Disease” and work book before patients 5th visit.    Other Core Component Interventions/Education:   Monitor Blood pressure pre, during, and post workout.  Discuss knowledge quiz results within first 6 weeks of program.  Make sure patient is compliant with their medications.  Make sure patient continues to carry updated medication list and the importance of maintaining one.   Current BP at 145/77.   Patient is compliant with their medication and carries updated medication list.   Provided Cardiac book” Living Well with Heart Disease” and work book and  explained how they will be used verbalized understanding. 5/7  Discussed knowledge quiz results verbalized understanding. 5/7  Quiz 1 5/7 Rehab and Strokes work no questions not completed.  Hand Out Overview of admitting diagnosis. Discussed verbalized understanding. 5/7  Quiz 4 5/7Medications no questions not completed.   Hand Out Medications After Heart Attack (The Basics) discussed verbalized understanding. 5/7  Hand Out Tips on Taking Medication discussed verbalized understanding. 5/7  Hand Out How medicines help prevent heart attacks discussed verbalized understanding. 5/7  Patient is compliant with their medications and continues to carry updated medication list, he has no medication changes at this time. 5/9  Current BP at 113/58. 5/9  Patient is compliant with their medications and continues to carry updated medication list, he has one medication change at this time he has had an increase in his arthritis medication.  6/2  Current BP at 126/58.  6/2    Individual Patient Goals:    Build Stamina  Get stronger and start exercising     Goal Status: In Progress    Staff Comments:  60 Day Assessment. Patient has completed 15 sessions. He is still having a lot of pain in his knees. He is working towards his new set MET Goal of 6.0 current METs at 4.7 it will be increased as tolerated. He states he has had a 40% improvement in his strength and endurance since starting in Rehab. Patients' cardiac monitor has maintained in Normal sinus rhythm throughout exercise. He has no concerns at this time.    Rehab Staff Signature: Marian Hernandez, CV, RRT, RCP

## 2025-06-06 ENCOUNTER — CLINICAL SUPPORT (OUTPATIENT)
Dept: CARDIAC REHAB | Facility: HOSPITAL | Age: 80
End: 2025-06-06
Payer: MEDICARE

## 2025-06-06 DIAGNOSIS — I25.10 CORONARY ARTERY DISEASE INVOLVING NATIVE CORONARY ARTERY OF NATIVE HEART WITHOUT ANGINA PECTORIS: Primary | ICD-10-CM

## 2025-06-06 DIAGNOSIS — Z95.5 S/P DRUG ELUTING CORONARY STENT PLACEMENT: ICD-10-CM

## 2025-06-06 PROCEDURE — 93798 PHYS/QHP OP CAR RHAB W/ECG: CPT | Performed by: STUDENT IN AN ORGANIZED HEALTH CARE EDUCATION/TRAINING PROGRAM

## 2025-06-09 ENCOUNTER — CLINICAL SUPPORT (OUTPATIENT)
Dept: CARDIAC REHAB | Facility: HOSPITAL | Age: 80
End: 2025-06-09
Payer: MEDICARE

## 2025-06-09 ENCOUNTER — TELEPHONE (OUTPATIENT)
Dept: PRIMARY CARE | Facility: CLINIC | Age: 80
End: 2025-06-09
Payer: COMMERCIAL

## 2025-06-09 DIAGNOSIS — Z95.5 S/P DRUG ELUTING CORONARY STENT PLACEMENT: ICD-10-CM

## 2025-06-09 DIAGNOSIS — I25.10 CORONARY ARTERY DISEASE INVOLVING NATIVE CORONARY ARTERY OF NATIVE HEART WITHOUT ANGINA PECTORIS: Primary | ICD-10-CM

## 2025-06-09 PROCEDURE — 93798 PHYS/QHP OP CAR RHAB W/ECG: CPT | Performed by: STUDENT IN AN ORGANIZED HEALTH CARE EDUCATION/TRAINING PROGRAM

## 2025-06-09 NOTE — TELEPHONE ENCOUNTER
Asking for a call back about when he should get his blood work done. He has medicare insurance and want's to make sure he doesn't get it done too soon. [phone]

## 2025-06-09 NOTE — TELEPHONE ENCOUNTER
After speaking with Jessica with [facility] lab, she reviewed lab orders and stated he should be able to have them drawn a few days prior to  his apt - I notified pt he voiced understanding

## 2025-06-11 ENCOUNTER — CLINICAL SUPPORT (OUTPATIENT)
Dept: CARDIAC REHAB | Facility: HOSPITAL | Age: 80
End: 2025-06-11
Payer: MEDICARE

## 2025-06-11 DIAGNOSIS — I25.10 CORONARY ARTERY DISEASE INVOLVING NATIVE CORONARY ARTERY OF NATIVE HEART WITHOUT ANGINA PECTORIS: Primary | ICD-10-CM

## 2025-06-11 DIAGNOSIS — Z95.5 S/P DRUG ELUTING CORONARY STENT PLACEMENT: ICD-10-CM

## 2025-06-11 PROCEDURE — 93798 PHYS/QHP OP CAR RHAB W/ECG: CPT | Performed by: STUDENT IN AN ORGANIZED HEALTH CARE EDUCATION/TRAINING PROGRAM

## 2025-06-13 ENCOUNTER — CLINICAL SUPPORT (OUTPATIENT)
Dept: CARDIAC REHAB | Facility: HOSPITAL | Age: 80
End: 2025-06-13
Payer: MEDICARE

## 2025-06-13 DIAGNOSIS — I25.10 CORONARY ARTERY DISEASE INVOLVING NATIVE CORONARY ARTERY OF NATIVE HEART WITHOUT ANGINA PECTORIS: Primary | ICD-10-CM

## 2025-06-13 DIAGNOSIS — Z95.5 S/P DRUG ELUTING CORONARY STENT PLACEMENT: ICD-10-CM

## 2025-06-13 PROCEDURE — 93798 PHYS/QHP OP CAR RHAB W/ECG: CPT | Performed by: STUDENT IN AN ORGANIZED HEALTH CARE EDUCATION/TRAINING PROGRAM

## 2025-06-14 LAB
25(OH)D3+25(OH)D2 SERPL-MCNC: 81 NG/ML (ref 30–100)
ALBUMIN SERPL-MCNC: 4.3 G/DL (ref 3.6–5.1)
ALP SERPL-CCNC: 35 U/L (ref 35–144)
ALT SERPL-CCNC: 15 U/L (ref 9–46)
ANION GAP SERPL CALCULATED.4IONS-SCNC: 6 MMOL/L (CALC) (ref 7–17)
AST SERPL-CCNC: 18 U/L (ref 10–35)
BILIRUB SERPL-MCNC: 0.6 MG/DL (ref 0.2–1.2)
BUN SERPL-MCNC: 25 MG/DL (ref 7–25)
CALCIUM SERPL-MCNC: 9.5 MG/DL (ref 8.6–10.3)
CHLORIDE SERPL-SCNC: 107 MMOL/L (ref 98–110)
CO2 SERPL-SCNC: 28 MMOL/L (ref 20–32)
CREAT SERPL-MCNC: 1.13 MG/DL (ref 0.7–1.28)
EGFRCR SERPLBLD CKD-EPI 2021: 67 ML/MIN/1.73M2
GLUCOSE SERPL-MCNC: 90 MG/DL (ref 65–99)
POTASSIUM SERPL-SCNC: 4.3 MMOL/L (ref 3.5–5.3)
PROT SERPL-MCNC: 6.2 G/DL (ref 6.1–8.1)
SODIUM SERPL-SCNC: 141 MMOL/L (ref 135–146)

## 2025-06-16 ENCOUNTER — APPOINTMENT (OUTPATIENT)
Dept: PRIMARY CARE | Facility: CLINIC | Age: 80
End: 2025-06-16
Payer: MEDICARE

## 2025-06-16 ENCOUNTER — CLINICAL SUPPORT (OUTPATIENT)
Dept: CARDIAC REHAB | Facility: HOSPITAL | Age: 80
End: 2025-06-16
Payer: MEDICARE

## 2025-06-16 VITALS
HEART RATE: 68 BPM | WEIGHT: 150 LBS | BODY MASS INDEX: 22.22 KG/M2 | HEIGHT: 69 IN | OXYGEN SATURATION: 98 % | DIASTOLIC BLOOD PRESSURE: 64 MMHG | SYSTOLIC BLOOD PRESSURE: 118 MMHG

## 2025-06-16 VITALS — SYSTOLIC BLOOD PRESSURE: 154 MMHG | DIASTOLIC BLOOD PRESSURE: 82 MMHG

## 2025-06-16 DIAGNOSIS — N40.1 BENIGN PROSTATIC HYPERPLASIA WITH NOCTURIA: ICD-10-CM

## 2025-06-16 DIAGNOSIS — Z95.5 S/P DRUG ELUTING CORONARY STENT PLACEMENT: ICD-10-CM

## 2025-06-16 DIAGNOSIS — Z00.00 ROUTINE GENERAL MEDICAL EXAMINATION AT HEALTH CARE FACILITY: Primary | ICD-10-CM

## 2025-06-16 DIAGNOSIS — R35.1 BENIGN PROSTATIC HYPERPLASIA WITH NOCTURIA: ICD-10-CM

## 2025-06-16 DIAGNOSIS — Z13.220 SCREENING CHOLESTEROL LEVEL: ICD-10-CM

## 2025-06-16 DIAGNOSIS — I10 ESSENTIAL HYPERTENSION: ICD-10-CM

## 2025-06-16 DIAGNOSIS — I25.10 CORONARY ARTERY DISEASE INVOLVING NATIVE CORONARY ARTERY OF NATIVE HEART WITHOUT ANGINA PECTORIS: Primary | ICD-10-CM

## 2025-06-16 DIAGNOSIS — Z12.5 SCREENING FOR PROSTATE CANCER: ICD-10-CM

## 2025-06-16 DIAGNOSIS — M25.50 ARTHRALGIA, UNSPECIFIED JOINT: ICD-10-CM

## 2025-06-16 PROCEDURE — 99214 OFFICE O/P EST MOD 30 MIN: CPT

## 2025-06-16 PROCEDURE — 93798 PHYS/QHP OP CAR RHAB W/ECG: CPT | Performed by: STUDENT IN AN ORGANIZED HEALTH CARE EDUCATION/TRAINING PROGRAM

## 2025-06-16 PROCEDURE — G0439 PPPS, SUBSEQ VISIT: HCPCS

## 2025-06-16 PROCEDURE — 1036F TOBACCO NON-USER: CPT

## 2025-06-16 PROCEDURE — 3074F SYST BP LT 130 MM HG: CPT

## 2025-06-16 PROCEDURE — 1159F MED LIST DOCD IN RCRD: CPT

## 2025-06-16 PROCEDURE — 3078F DIAST BP <80 MM HG: CPT

## 2025-06-16 PROCEDURE — 1170F FXNL STATUS ASSESSED: CPT

## 2025-06-16 RX ORDER — PANTOPRAZOLE SODIUM 40 MG/1
40 TABLET, DELAYED RELEASE ORAL DAILY
COMMUNITY
Start: 2025-05-18

## 2025-06-16 ASSESSMENT — ACTIVITIES OF DAILY LIVING (ADL)
DOING_HOUSEWORK: INDEPENDENT
BATHING: INDEPENDENT
TAKING_MEDICATION: INDEPENDENT
GROCERY_SHOPPING: INDEPENDENT
DRESSING: INDEPENDENT
MANAGING_FINANCES: INDEPENDENT

## 2025-06-16 ASSESSMENT — ENCOUNTER SYMPTOMS
GASTROINTESTINAL NEGATIVE: 1
CARDIOVASCULAR NEGATIVE: 1
CONSTITUTIONAL NEGATIVE: 1
RESPIRATORY NEGATIVE: 1

## 2025-06-16 ASSESSMENT — PATIENT HEALTH QUESTIONNAIRE - PHQ9
SUM OF ALL RESPONSES TO PHQ9 QUESTIONS 1 AND 2: 0
1. LITTLE INTEREST OR PLEASURE IN DOING THINGS: NOT AT ALL
2. FEELING DOWN, DEPRESSED OR HOPELESS: NOT AT ALL

## 2025-06-16 NOTE — PROGRESS NOTES
"Subjective   Reason for Visit: Gomez Abreu is an 79 y.o. male here for a Medicare Wellness visit.     Past Medical, Surgical, and Family History reviewed and updated in chart.    Reviewed all medications by prescribing practitioner or clinical pharmacist (such as prescriptions, OTCs, herbal therapies and supplements) and documented in the medical record.    HPI  BPH: Following with urology. Stable.  ARTHRALGIA: Has seen ortho, inflammation to BL hands, weakness d/t inflammation, daily. BL patella alto as well so chronic knee pain. Cannot NSAID d/t antiplatelets.   CAD: Following with cardio, pacemaker.    Patient Care Team:  Sundar Moran PA-C as PCP - General (Family Medicine)  Maikel Ramon MD as PCP - [facility] ACO Attributed Provider     Review of Systems   Constitutional: Negative.    Respiratory: Negative.     Cardiovascular: Negative.    Gastrointestinal: Negative.        Objective   Vitals:  /64   Pulse 68   Ht 1.753 m (5' 9\")   Wt 68 kg (150 lb)   SpO2 98%   BMI 22.15 kg/m²       Physical Exam  Constitutional:       General: He is not in acute distress.     Appearance: Normal appearance. He is not ill-appearing.   HENT:      Head: Normocephalic and atraumatic.   Eyes:      Extraocular Movements: Extraocular movements intact.      Conjunctiva/sclera: Conjunctivae normal.   Cardiovascular:      Rate and Rhythm: Normal rate.   Pulmonary:      Effort: Pulmonary effort is normal.   Abdominal:      General: There is no distension.   Musculoskeletal:         General: Normal range of motion.      Cervical back: Normal range of motion.   Skin:     General: Skin is warm and dry.   Neurological:      General: No focal deficit present.      Mental Status: He is alert and oriented to person, place, and time.   Psychiatric:         Mood and Affect: Mood normal.         Behavior: Behavior normal.         Thought Content: Thought content normal.         Judgment: Judgment normal.         Assessment & " Plan  Routine general medical examination at health care facility         Arthralgia, unspecified joint    Orders:    ARTI with Reflex to LEANNE; Future    C-reactive protein; Future    Sedimentation Rate; Future    Rheumatoid factor; Future    Screening for prostate cancer    Orders:    Prostate Specific Antigen, Screen; Future    Benign prostatic hyperplasia with nocturia    Orders:    Prostate Specific Antigen, Screen; Future    Essential hypertension    Orders:    Comprehensive Metabolic Panel; Future    CBC; Future    Screening cholesterol level    Orders:    Lipid Panel; Future            He would like to wait until follow up for rheum labs for arthralgia, he is seeing Dr. Wilcox currently for detox.  Labs reviewed and stable.  No medication changes today.  Follow up 6 months with fasting labs prior.

## 2025-06-18 ENCOUNTER — APPOINTMENT (OUTPATIENT)
Dept: CARDIAC REHAB | Facility: HOSPITAL | Age: 80
End: 2025-06-18
Payer: MEDICARE

## 2025-06-20 ENCOUNTER — APPOINTMENT (OUTPATIENT)
Dept: PRIMARY CARE | Facility: CLINIC | Age: 80
End: 2025-06-20
Payer: MEDICARE

## 2025-06-20 ENCOUNTER — APPOINTMENT (OUTPATIENT)
Dept: CARDIAC REHAB | Facility: HOSPITAL | Age: 80
End: 2025-06-20
Payer: MEDICARE

## 2025-06-23 ENCOUNTER — CLINICAL SUPPORT (OUTPATIENT)
Dept: CARDIAC REHAB | Facility: HOSPITAL | Age: 80
End: 2025-06-23
Payer: MEDICARE

## 2025-06-23 VITALS — SYSTOLIC BLOOD PRESSURE: 138 MMHG | DIASTOLIC BLOOD PRESSURE: 67 MMHG

## 2025-06-23 DIAGNOSIS — I25.10 CORONARY ARTERY DISEASE INVOLVING NATIVE CORONARY ARTERY OF NATIVE HEART WITHOUT ANGINA PECTORIS: Primary | ICD-10-CM

## 2025-06-23 DIAGNOSIS — Z95.5 S/P DRUG ELUTING CORONARY STENT PLACEMENT: ICD-10-CM

## 2025-06-23 PROCEDURE — 93798 PHYS/QHP OP CAR RHAB W/ECG: CPT | Performed by: STUDENT IN AN ORGANIZED HEALTH CARE EDUCATION/TRAINING PROGRAM

## 2025-06-24 ENCOUNTER — TELEPHONE (OUTPATIENT)
Dept: PRIMARY CARE | Facility: CLINIC | Age: 80
End: 2025-06-24
Payer: COMMERCIAL
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
## 2025-06-25 ENCOUNTER — APPOINTMENT (OUTPATIENT)
Dept: CARDIAC REHAB | Facility: HOSPITAL | Age: 80
End: 2025-06-25
Payer: MEDICARE

## 2025-06-25 ENCOUNTER — TELEPHONE (OUTPATIENT)
Dept: CARDIOLOGY | Facility: CLINIC | Age: 80
End: 2025-06-25
Payer: COMMERCIAL

## 2025-06-25 NOTE — TELEPHONE ENCOUNTER
Pt calls. Would like to know if there is any contraindication for him to take pain medication as he has a knee injury and the pain is about unbearable. Is currently on ASA and plavix.

## 2025-06-26 ENCOUNTER — APPOINTMENT (OUTPATIENT)
Dept: RADIOLOGY | Facility: HOSPITAL | Age: 80
End: 2025-06-26
Payer: MEDICARE

## 2025-06-27 ENCOUNTER — APPOINTMENT (OUTPATIENT)
Dept: CARDIAC REHAB | Facility: HOSPITAL | Age: 80
End: 2025-06-27
Payer: MEDICARE

## 2025-06-27 NOTE — PROGRESS NOTES
Cardiac Rehabilitation Discharge    Name: Gomez Abreu  Medical Record Number: [id number]  YOB: 1945  Age: 79 y.o.    Today's Date: 6/27/2025   Primary Care Physician: Sundar Moran PA-C  Referring Physician: Janeth Caro, LIZBET*, Kenn Baig MD  Program Location: [address]   Exercise Start Date : 4/21/2025    General  Primary Diagnosis:   1. Coronary artery disease involving native coronary artery of native heart without angina pectoris  Referral to Cardiac Rehab - outpatient (for providers who will be following patient along cardiac rehab)      2. S/P drug eluting coronary stent placement  Referral to Cardiac Rehab - outpatient (for providers who will be following patient along cardiac rehab)         Onset/Date of Diagnosis: 02/24/2025    Session #: 22    AACVPR Risk Stratification: Moderate    Falls Risk: Low age > 65  Psychosocial Assessment     Pre PHQ-9: ZERO  Post PHQ-9:  Sent PH-Q 9 to MD if score > 20: NO; score <20    Pt reported/currently experiencing stress: No  Patient uses stress management skills: Yes   History of: no history of anxiety or depression  Currently seeing a mental health provider: No  Social Support: Yes, Whom: Family  Quality of Life Survey: SF-36   SF-36 Pre Post   Physical Component Score 48.15 TBD   Mental Component Score 61.35 TBD     Learning Assessment:  Learning assessment/barriers: None  Preferred learning method: All  Barriers: None  Comments: None    Stages of Change: Action    Psychosocial Plan    Goal Status: Not Completed    Psychosocial Goals:   1. Maintain or decrease PHQ-9 score of Zero by discharge.  2. Improve SF36 scores by discharge. Will retake prior to discharge.  3. Question patient on new or current psychological issues at least every 30 days.  4. Educate patient on Stress Management skills and apply them to reported stressors while in the program.    Psychosocial Interventions/Education:   Provide one on one emotional support as  "needed.   Current PQ9 score at Zero. 4/14  Patient reports current Emotional and Stress level at 1-2. 4/14  Quiz 6 5/7 Your Emotional Health no questions not completed.  Hand Out Stress Management Overview 8 Tips / Steps discussed verbalized understanding.5/7  Hand Out Stress Less for a Healthier Heart. 5/7  Questioned patient on new or current psychological issues, none to report at this time. 5/9  Stress level at Zero. 5/9  Questioned patient on new or current psychological issues, none to report at this time. 6/2  Stress level at 1. 6/2    Nutrition Assessment:    Hyperlipidemia: Yes     Lipids:   Lab Results   Component Value Date    CHOL 185 12/09/2024    HDL 46.0 12/09/2024    LDLF 146 (H) 11/14/2022    TRIG 145 12/09/2024       Current Dietary Guidelines: PYP score of 72 A healthful dietary patytern, although there may be some specific behaviors that could be improved.  Barriers to dietary change: no    Diet Habit Survey: Picture Your Plate  Pre: 72/96  Post: To be done at discharge.    Diabetes Assessment    Lab Results   Component Value Date    HGBA1C 5.2 02/24/2024       History of Diabetes: No    Weight Management    Height: 69.5\"  Weight LBS: PRE: 154 POST:  Change:   BMI (Calculated): 22.4  Waist Circumference: PRE: 32\"  POST:  Change:    Nutrition Plan    Goal Status: Not Completed    Nutrition Goals:   Maintain or improve your \"Picture Your Plate Score\" of 72 by 3 points by discharge.  Lose .5 to1 inch from waist by discharge (32 inches on admit).  Provide education on nutritional aspects related to cardiac health and discuss dietary educational topics while in the program.  Educate patient on their admitting Diagnosis of CAD and Drug eluting Stents, and their medical HX and how they are relevant to their health.     Nutrition Interventions/Education:   1.  Patient has a HX of HTN, Hyperlipidemia, CP, Pacer, CHF, and Heart Murmur.  2.  Discuss \"Picture Your Plate Score\" within the first 6 weeks of " "program.   3.  Quiz 2 5/7 Nutrition no questions not completed.  4. Discussed \"Picture Your Plate Scores\" and ways to improve scores. Verbalized understanding. 5/7  5. Hand Out Mediterranean Diet Overview, What and how much to Eat, Meals and Snacking, Resources to more information, and Shopping Ideas. Discussed and verbalized understanding. 5/7  6. Hand Out Tasting The Mediterranean Diet 8 Simple Steps discussed verbalized understanding. 5/7  7. Hand Out Heart Healthy Eating on a Budget. Discussed and verbalized understanding. 5/7  8. Hand Out Building a Heart Healthy Plate. Discussed and verbalized understanding. 5/7  9. Quiz 5 5/7 Managing specific risk factors High cholesterol ,Blood Pressure and Diabetes no questions not completed.   10. Hand Out Overview on Cholesterol discussed and verbalized understanding. 5/7  11. Hand Out Blood Pressure, Herbs and Spices instead of Salt discussed verbalized understanding. 5/7  12. Hand Out Consequences of High Blood Pressure discussed verbalized understanding. 5/7   13. Hand Out Understanding Diabetes, Take Diabetes to Heart discussed verbalized understanding. 5/7  14. Hand Out Diabetes Know your numbers discussed and verbalized understanding. 5/7  15. Hand Out ADA Food for Thought discussed verbalized understanding. 5/7  16. Hand Out High Cholesterol Overview discussed verbalized understanding. 5/7  17. Hand Out How to control Cholesterol discussed verbalized understanding. 5/7    Exercise Assessment    Home Exercise: Yes Walking Trails  Mode: Aerobic  Frequency: 3-4 Days/ WK  Duration: 1+ hours    Exercise Prescription    DASI: TL SCORE:  50.7    MET Score: 9.0    6MWT: Yes  Pre Test O2 Sat/HR: 97/60  6 Minute O2 Sat/HR: 98/76  Distance Walked(ft): Pre: 1440  Post:  Christina Dyspnea: 0  Christina PRE: 0  Post Test O2 Sat/HR: 97/62  Adverse Reactions: none     Frequency:  3 days/week   Mode: Aerobic   Duration: >30 total aerobic minutes   Intensity: RPE <15              Target HR:  " Rest + 30  MAX 6MW MET Level:  PRE: 3.09   POST:  Patient wears supplemental O2: No   Current Max Exercise Mets: 4.5     Modality METs Workload LVL Duration (minutes)   1 Pre-Exercise       2 Warm Up        3 NuStep 4.5 [address]  4 18 :00   4 Recumbent Bike     12 :00   5 Treadmill 4.0 2.7 MPH 3% 18 :00   6 Weights 3.4 4 LBS 10 5:00   7 Cool Down       8 Post-Exercise         Maintain Heart Rate at 80-90% of Maximum for patients age 89.  Resistance Training: Started on patients 9th visit.  Home Exercise Prescription given: To be given prior to discharge from program.    Exercise Plan    Goal Status: Not Completed    Exercise Goals:   Improve post 6MW by discharge.  Increase Mets to 4.0 by the end of the program. Achieved 5/9  Start exercise program at home second week into the program.  Gain independence and confidence with exercise while in the program.   Have a plan for continuing exercise after completion of program.  New METs goal of 6.0 by the end of program. 5/9    Exercise Interventions/Education:   What exactly are METs handout provided and discussed Verbalized understanding. 4/14  [place] exertion handout provided and discussed patient verbalized understanding. 4/14  Discussed the flow of exercise class and that he would have three BP's taken and wear telemetry while exercising patient verbalized understanding. 4/14  Increase METs by 1.0 every weeks or as tolerated.  Patient is not doing any home exercises. We had a discussion on the importance of exercising at home.   Quiz 3  5/7 Exercising More no questions not completed.  Hand Out Aerobic Exercising Overview discussed verbalized understanding.  Hand Out Real - Life Benefits of Exercise and Physical Activity discussed verbalized understanding. 5/7  Hand Out Drinking Enough Fluids discussed verbalized understanding. 5/7  Hand Out 4 Types of Exercises to improve your discussed verbalized understanding. 5/7  Hand Out [facility] [place]ps/Cool downs and Strength  Training Exercises that are displayed in class.   Achieved first METs goal of 4.0.   Exercising by walking on trails for 6 Days/ wk for 2+ hour at a time. 5  Current METS at 4.8. 5/9  Exercising by walking on trails for 3-4 Days/ wk for 1+ hour at a time.    Current METS at 4.7. 6/  Exercising by walking on trails for 3-4 Days/ wk for 1+ hour at a time.    Current METS at 4.5.      Other Core Components/Risk Factor Assessment:    Medication adherence  Current Medications:   Medication Documentation Review Audit       Reviewed by John Baig MD (Physician) on 03/10/25 at 1539      Medication Order Taking? Sig Documenting Provider Last Dose Status   albuterol 90 mcg/actuation inhaler [id number]  Inhale 2 puffs every 6 hours if needed for wheezing or shortness of breath.   Patient not taking: Reported on 2025    Historical Provider, MD   10/23/24 1498   alpha tocopherol (Vitamin E) 268 mg (400 unit) capsule [id number] Yes Take 1 capsule (400 Units) by mouth once daily. Historical Provider, MD  Active   amLODIPine (Norvasc) 5 mg tablet [id number]  Take 1 tablet (5 mg) by mouth once daily.   Patient not taking: Reported on 3/10/2025    Nadia Mendez MD  Active   ASCORBIC ACID, VITAMIN C, ORAL [id number] Yes Take 500 mg by mouth twice a day. Historical Provider, MD  Active   aspirin 81 mg EC tablet [id number] Yes Take 1 tablet (81 mg) by mouth once daily. Historical Provider, MD  Active   b complex vitamins capsule [id number]  Take 1 capsule by mouth once daily.   Patient not taking: Reported on 3/10/2025    Historical MD Vanessa  Active   calcium citrate (Calcitrate) 100 mg (475 mg) split tablet [id number] Yes Take 2 half tablet (950 mg) by mouth once daily. Historical Provider, MD  Active   catheter (B[employer] Catheter) [facility] [place] [id number]  USE PRN FOR URINARY RETENTION   Patient not taking: Reported on 3/10/2025    Tang Anguiano MD  Active   cephalexin (Keflex) 500 mg  capsule [id number]  Take 1 capsule (500 mg) by mouth 4 times a day for 7 days. Take with food.   Patient not taking: Reported on 3/10/2025    Whitney DINAH Fredis, APRN-CNP   25 2359   cholecalciferol (Vitamin D-3) 5,000 Units tablet [id number]  Take 0.5 tablets (2,500 Units) by mouth twice a day.   Patient not taking: Reported on 3/10/2025    Historical Provider, MD  Active   clopidogrel (Plavix) 75 mg tablet [id number] Yes Take by mouth once daily. Historical Provider, MD  Active   ezetimibe (Zetia) 10 mg tablet [id number] Yes Take 1 tablet (10 mg) by mouth once daily. Historical Provider, MD  Active   famotidine (Pepcid) 20 mg tablet [id number]  Take 1 tablet (20 mg) by mouth once daily.   Patient not taking: Reported on 3/10/2025    Historical MD Vanessa  Active   isosorbide mononitrate ER (Imdur) 30 mg 24 hr tablet [id number]  Take 1 tablet (30 mg) by mouth once daily.   Patient not taking: Reported on 3/10/2025    Historical MD Vanessa   10/23/24 2359   magnesium 250 mg tablet [id number]  Take 1 tablet (250 mg) by mouth once daily.   Patient not taking: Reported on 3/10/2025    Historical MD Vanessa  Active   nitroglycerin (Nitrostat) 0.4 mg SL tablet [id number]  Place 1 tablet (0.4 mg) under the tongue every 5 minutes if needed for chest pain.   Patient not taking: Reported on 3/10/2025    Historical MD Vanessa  Active   omega 3-dha-epa-fish oil (Fish OiL) [phone] mg capsule,delayed release(DR/EC) [id number]  Take 1 capsule by mouth once daily.   Patient not taking: Reported on 3/10/2025    Historical MD Vanessa  Active   pantoprazole (ProtoNix) 20 mg EC tablet [id number] Yes Take 1 tablet (20 mg) by mouth once daily in the morning. Take before meals. 10mg qd  Do not crush, chew, or split. Historical Provider, MD  Active   prochlorperazine (Compazine) 10 mg tablet [id number]  Take 0.5 tablets (5 mg) by mouth every 6 hours if needed for nausea or vomiting.   Patient not taking: Reported on  3/10/2025    Devin Estrada DO  Active   ranolazine (Ranexa) 500 mg 12 hr tablet [id number]  Take 1 tablet (500 mg) by mouth every 12 hours.   Patient not taking: Reported on 3/10/2025    Historical Provider, MD  Active   sulfamethoxazole-trimethoprim (Bactrim DS) 800-160 mg tablet [id number]  Take 1 tablet by mouth 2 times a day for 5 days. Sundar Moran PA-C   25 2359   tamsulosin (Flomax) 0.4 mg 24 hr capsule [id number] Yes TAKE 1 CAPSULE AT BEDTIME Tang Anguiano MD  Active   VITAMIN A ORAL [id number]  Take 2,400 Units by mouth once daily.   Patient not taking: Reported on 3/10/2025    Historical Provider, MD  Active   zinc gluconate 50 mg tablet [id number] Yes Take 1 tablet (50 mg) by mouth once daily. Historical Provider, MD  Active                                 Medication compliance: Yes   Uses pill box/organizer: Yes    Carries medication list: Yes     Blood Pressure Management  History of Hypertension: Yes   Medication Changes: No   Resting BP:   145/77    Heart Failure Management  Hx of Heart Failure: NO    Smoking/Tobacco Assessment  Social History     Tobacco Use   Smoking Status Never   Smokeless Tobacco Never       Other Core Component Plan    Goal Status: Not Completed    Other Core Component Goals:   Increase knowledge test score from 13 out of 15 to 15 out of 15 by discharge.  Maintain Resting blood pressure of <130/80 while in the program.  Provide Cardiac book” Living Well with Heart Disease” and work book before patients 5th visit.    Other Core Component Interventions/Education:   Monitor Blood pressure pre, during, and post workout.  Discuss knowledge quiz results within first 6 weeks of program.  Make sure patient is compliant with their medications.  Make sure patient continues to carry updated medication list and the importance of maintaining one.   Current BP at 145/77.   Patient is compliant with their medication and carries updated medication list.   Provided Cardiac book”  Living Well with Heart Disease” and work book and explained how they will be used verbalized understanding. 5/7  Discussed knowledge quiz results verbalized understanding. 5/7  Quiz 1 5/7 Rehab and Strokes work no questions not completed.  Hand Out Overview of admitting diagnosis. Discussed verbalized understanding. 5/7  Quiz 4 5/7Medications no questions not completed.   Hand Out Medications After Heart Attack (The Basics) discussed verbalized understanding. 5/7  Hand Out Tips on Taking Medication discussed verbalized understanding. 5/7  Hand Out How medicines help prevent heart attacks discussed verbalized understanding. 5/7  Patient is compliant with their medications and continues to carry updated medication list, he has no medication changes at this time. 5/9  Current BP at 113/58. 5/9  Patient is compliant with their medications and continues to carry updated medication list, he has one medication change at this time he has had an increase in his arthritis medication.  6/2  Current BP at 126/58.  6/2  Patient is compliant with their medications and continues to carry updated medication list, he has no medication change at this time. 6/23  Current BP at 128/59. 6/23    Individual Patient Goals:    Build Stamina  Get stronger and start exercising     Goal Status: Not completed    Staff Comments:  Discharge Assessment. Patient has completed 22 sessions. He last attended rehab on 6/23/2025. Patient came in to tell us he cannot finish rehab due to his knee he had a fall and landed on his bad knee. He has to currently use a walker and he has to visit an orthopedic MD for his knee. His final Met was 4.5. At his assessment his improvement was at 40% improvement in his strength and endurance since starting in Rehab. Patients' cardiac monitor has maintained in Normal sinus rhythm throughout exercise. He has no concerns at this time.    Rehab Staff Signature: Marian Hernandez, CV, RRT, RCP

## 2025-06-27 NOTE — PROGRESS NOTES
Cardiac Rehabilitation 90-Day Assessment    Name: Gomez Abreu  Medical Record Number: [id number]  YOB: 1945  Age: 79 y.o.    Today's Date: 6/27/2025   Primary Care Physician: Sundar Moran PA-C  Referring Physician: Janeth Caro APRN-CN*, Kenn Baig MD  Program Location: [address]   Exercise Start Date : 4/21/2025    General  Primary Diagnosis:   1. Coronary artery disease involving native coronary artery of native heart without angina pectoris  Referral to Cardiac Rehab - outpatient (for providers who will be following patient along cardiac rehab)      2. S/P drug eluting coronary stent placement  Referral to Cardiac Rehab - outpatient (for providers who will be following patient along cardiac rehab)         Onset/Date of Diagnosis: 02/24/2025    Session #: 22    AACVPR Risk Stratification: Moderate    Falls Risk: Low age > 65  Psychosocial Assessment     Pre PHQ-9: ZERO  Post PHQ-9:  Sent PH-Q 9 to MD if score > 20: NO; score <20    Pt reported/currently experiencing stress: No  Patient uses stress management skills: Yes   History of: no history of anxiety or depression  Currently seeing a mental health provider: No  Social Support: Yes, Whom: Family  Quality of Life Survey: SF-36   SF-36 Pre Post   Physical Component Score 48.15 TBD   Mental Component Score 61.35 TBD     Learning Assessment:  Learning assessment/barriers: None  Preferred learning method: All  Barriers: None  Comments: None    Stages of Change: Action    Psychosocial Plan    Goal Status: In progress    Psychosocial Goals:   1. Maintain or decrease PHQ-9 score of Zero by discharge.  2. Improve SF36 scores by discharge. Will retake prior to discharge.  3. Question patient on new or current psychological issues at least every 30 days.  4. Educate patient on Stress Management skills and apply them to reported stressors while in the program.    Psychosocial Interventions/Education:   Provide one on one emotional  "support as needed.   Current PQ9 score at Zero. 4/14  Patient reports current Emotional and Stress level at 1-2. 4/14  Quiz 6 5/7 Your Emotional Health no questions not completed.  Hand Out Stress Management Overview 8 Tips / Steps discussed verbalized understanding.5/7  Hand Out Stress Less for a Healthier Heart. 5/7  Questioned patient on new or current psychological issues, none to report at this time. 5/9  Stress level at Zero. 5/9  Questioned patient on new or current psychological issues, none to report at this time. 6/2  Stress level at 1. 6/2  Questioned patient on new or current psychological issues, none to report at this time. 6/27  Stress level at zero. 6/27    Nutrition Assessment:    Hyperlipidemia: Yes     Lipids:   Lab Results   Component Value Date    CHOL 185 12/09/2024    HDL 46.0 12/09/2024    LDLF 146 (H) 11/14/2022    TRIG 145 12/09/2024       Current Dietary Guidelines: PYP score of 72 A healthful dietary patytern, although there may be some specific behaviors that could be improved.  Barriers to dietary change: no    Diet Habit Survey: Picture Your Plate  Pre: 72/96  Post: To be done at discharge.    Diabetes Assessment    Lab Results   Component Value Date    HGBA1C 5.2 02/24/2024       History of Diabetes: No    Weight Management    Height: 69.5\"  Weight LBS: PRE: 154 POST:  Change:   BMI (Calculated): 22.4  Waist Circumference: PRE: 32\"  POST:  Change:    Nutrition Plan    Goal Status: In progress    Nutrition Goals:   Maintain or improve your \"Picture Your Plate Score\" of 72 by 3 points by discharge.  Lose .5 to1 inch from waist by discharge (32 inches on admit).  Provide education on nutritional aspects related to cardiac health and discuss dietary educational topics while in the program.  Educate patient on their admitting Diagnosis of CAD and Drug eluting Stents, and their medical HX and how they are relevant to their health.     Nutrition Interventions/Education:   1.  Patient has a " "HX of HTN, Hyperlipidemia, CP, Pacer, CHF, and Heart Murmur.  2.  Discuss \"Picture Your Plate Score\" within the first 6 weeks of program.   3.  Quiz 2 5/7 Nutrition no questions not completed.  4.  Discussed \"Picture Your Plate Scores\" and ways to improve scores. Verbalized understanding. 5/7  5. Hand Out Mediterranean Diet Overview, What and how much to Eat, Meals and Snacking, Resources to more information, and Shopping Ideas. Discussed and verbalized understanding. 5/7  6. Hand Out Tasting The Mediterranean Diet 8 Simple Steps discussed verbalized understanding. 5/7  7. Hand Out Heart Healthy Eating on a Budget. Discussed and verbalized understanding. 5/7  8. Hand Out Building a Heart Healthy Plate. Discussed and verbalized understanding. 5/7  9. Quiz 5 5/7 Managing specific risk factors High cholesterol ,Blood Pressure and Diabetes no questions not completed.   10. Hand Out Overview on Cholesterol discussed and verbalized understanding. 5/7  11. Hand Out Blood Pressure, Herbs and Spices instead of Salt discussed verbalized understanding. 5/7  12. Hand Out Consequences of High Blood Pressure discussed verbalized understanding. 5/7   13. Hand Out Understanding Diabetes, Take Diabetes to Heart discussed verbalized understanding. 5/7  14. Hand Out Diabetes Know your numbers discussed and verbalized understanding. 5/7  15. Hand Out ADA Food for Thought discussed verbalized understanding. 5/7  16. Hand Out High Cholesterol Overview discussed verbalized understanding. 5/7  17. Hand Out How to control Cholesterol discussed verbalized understanding. 5/7    Exercise Assessment    Home Exercise: Yes Walking Trails  Mode: Aerobic  Frequency: 3-4 Days/ WK  Duration: 1+ hours    Exercise Prescription    DASI: TL SCORE:  50.7    MET Score: 9.0    6MWT: Yes  Pre Test O2 Sat/HR: 97/60  6 Minute O2 Sat/HR: 98/76  Distance Walked(ft): Pre: 1440  Post:  Christina Dyspnea: 0  Christina PRE: 0  Post Test O2 Sat/HR: 97/62  Adverse Reactions: " none     Frequency:  3 days/week   Mode: Aerobic   Duration: >30 total aerobic minutes   Intensity: RPE <15              Target HR:  Rest + 30  MAX 6MW MET Level:  PRE: 3.09   POST:  Patient wears supplemental O2: No   Current Max Exercise Mets: 4.5     Modality METs Workload LVL Duration (minutes)   1 Pre-Exercise       2 Warm Up        3 NuStep 4.5 [address]  4 18 :00   4 Recumbent Bike     12 :00   5 Treadmill 3.1 2.7 MPH  18 :00   6 Weights 3.9 4 LBS 10 5:00   7 Cool Down       8 Post-Exercise         Maintain Heart Rate at 80-90% of Maximum for patients age 89.  Resistance Training: Started on patients 9th visit.  Home Exercise Prescription given: To be given prior to discharge from program.    Exercise Plan    Goal Status: In Progress    Exercise Goals:   Improve post 6MW by discharge.  Increase Mets to 4.0 by the end of the program. Achieved 5/9  Start exercise program at home second week into the program.  Gain independence and confidence with exercise while in the program.   Have a plan for continuing exercise after completion of program.  New METs goal of 6.0 by the end of program. 5/9    Exercise Interventions/Education:   What exactly are METs handout provided and discussed Verbalized understanding. 4/14  [place] exertion handout provided and discussed patient verbalized understanding. 4/14  Discussed the flow of exercise class and that he would have three BP's taken and wear telemetry while exercising patient verbalized understanding. 4/14  Increase METs by 1.0 every weeks or as tolerated.  Patient is not doing any home exercises. We had a discussion on the importance of exercising at home.   Quiz 3  5/7 Exercising More no questions not completed.  Hand Out Aerobic Exercising Overview discussed verbalized understanding.  Hand Out Real - Life Benefits of Exercise and Physical Activity discussed verbalized understanding. 5/7  Hand Out Drinking Enough Fluids discussed verbalized understanding. 5/7  Hand Out 4  Types of Exercises to improve your discussed verbalized understanding.   Hand Out [facility] Warm Ups/Cool downs and Strength Training Exercises that are displayed in class.   Achieved first METs goal of 4.0. 5  Exercising by walking on trails for 6 Days/ wk for 2+ hour at a time.   Current METS at 4.8. 5/9  Exercising by walking on trails for 3-4 Days/ wk for 1+ hour at a time.    Current METS at 4.7. 6/2  Exercising by walking on trails for 3-4 Days/ wk for 1+ hour at a time. When he is able to exercise he has been having a lot of knee problems and he has had a fall on his bad knee.    Current METS at 4.5    Other Core Components/Risk Factor Assessment:    Medication adherence  Current Medications:   Medication Documentation Review Audit       Reviewed by John Baig MD (Physician) on 03/10/25 at 1539      Medication Order Taking? Sig Documenting Provider Last Dose Status   albuterol 90 mcg/actuation inhaler [id number]  Inhale 2 puffs every 6 hours if needed for wheezing or shortness of breath.   Patient not taking: Reported on 2025    Historical Provider, MD   10/23/24 0669   alpha tocopherol (Vitamin E) 268 mg (400 unit) capsule [id number] Yes Take 1 capsule (400 Units) by mouth once daily. Historical Provider, MD  Active   amLODIPine (Norvasc) 5 mg tablet [id number]  Take 1 tablet (5 mg) by mouth once daily.   Patient not taking: Reported on 3/10/2025    Historical MD Vanessa  Active   ASCORBIC ACID, VITAMIN C, ORAL [id number] Yes Take 500 mg by mouth twice a day. Historical Provider, MD  Active   aspirin 81 mg EC tablet [id number] Yes Take 1 tablet (81 mg) by mouth once daily. Historical Provider, MD  Active   b complex vitamins capsule [id number]  Take 1 capsule by mouth once daily.   Patient not taking: Reported on 3/10/2025    Nadia Mendez MD  Active   calcium citrate (Calcitrate) 100 mg (475 mg) split tablet [id number] Yes Take 2 half tablet (950 mg) by mouth once  daily. Historical Provider, MD  Active   catheter (Bardia [employer]ber Catheter) 14 Fr [facility] [phone]  USE PRN FOR URINARY RETENTION   Patient not taking: Reported on 3/10/2025    Tang Anguiano MD  Active   cephalexin (Keflex) 500 mg capsule [id number]  Take 1 capsule (500 mg) by mouth 4 times a day for 7 days. Take with food.   Patient not taking: Reported on 3/10/2025    Whitney Mcneal, APRN-CNP   25 2359   cholecalciferol (Vitamin D-3) 5,000 Units tablet [id number]  Take 0.5 tablets (2,500 Units) by mouth twice a day.   Patient not taking: Reported on 3/10/2025    Historical Provider, MD  Active   clopidogrel (Plavix) 75 mg tablet [id number] Yes Take by mouth once daily. Historical Provider, MD  Active   ezetimibe (Zetia) 10 mg tablet [id number] Yes Take 1 tablet (10 mg) by mouth once daily. Historical Provider, MD  Active   famotidine (Pepcid) 20 mg tablet [id number]  Take 1 tablet (20 mg) by mouth once daily.   Patient not taking: Reported on 3/10/2025    Historical Provider, MD  Active   isosorbide mononitrate ER (Imdur) 30 mg 24 hr tablet [id number]  Take 1 tablet (30 mg) by mouth once daily.   Patient not taking: Reported on 3/10/2025    Nadia Mendez MD   10/23/24 2359   magnesium 250 mg tablet [id number]  Take 1 tablet (250 mg) by mouth once daily.   Patient not taking: Reported on 3/10/2025    Historical MD Vanessa  Active   nitroglycerin (Nitrostat) 0.4 mg SL tablet [id number]  Place 1 tablet (0.4 mg) under the tongue every 5 minutes if needed for chest pain.   Patient not taking: Reported on 3/10/2025    Historical MD Vanessa  Active   omega 3-dha-epa-fish oil (Fish OiL) [phone] mg capsule,delayed release(DR/EC) [id number]  Take 1 capsule by mouth once daily.   Patient not taking: Reported on 3/10/2025    Nadia Mendez MD  Active   pantoprazole (ProtoNix) 20 mg EC tablet [id number] Yes Take 1 tablet (20 mg) by mouth once daily in the morning. Take before meals. 10mg qd  Do not  crush, chew, or split. Historical Provider, MD  Active   prochlorperazine (Compazine) 10 mg tablet [id number]  Take 0.5 tablets (5 mg) by mouth every 6 hours if needed for nausea or vomiting.   Patient not taking: Reported on 3/10/2025    Devin Estrada DO  Active   ranolazine (Ranexa) 500 mg 12 hr tablet [id number]  Take 1 tablet (500 mg) by mouth every 12 hours.   Patient not taking: Reported on 3/10/2025    Historical Provider, MD  Active   sulfamethoxazole-trimethoprim (Bactrim DS) 800-160 mg tablet [id number]  Take 1 tablet by mouth 2 times a day for 5 days. Sundar Moran PA-C   25 4109   tamsulosin (Flomax) 0.4 mg 24 hr capsule [id number] Yes TAKE 1 CAPSULE AT BEDTIME Tang Anguiano MD  Active   VITAMIN A ORAL [id number]  Take 2,400 Units by mouth once daily.   Patient not taking: Reported on 3/10/2025    Historical Provider, MD  Active   zinc gluconate 50 mg tablet [id number] Yes Take 1 tablet (50 mg) by mouth once daily. Historical Provider, MD  Active                                 Medication compliance: Yes   Uses pill box/organizer: Yes    Carries medication list: Yes     Blood Pressure Management  History of Hypertension: Yes   Medication Changes: No   Resting BP:   145/77    Heart Failure Management  Hx of Heart Failure: NO    Smoking/Tobacco Assessment  Social History     Tobacco Use   Smoking Status Never   Smokeless Tobacco Never       Other Core Component Plan    Goal Status: In Progress    Other Core Component Goals:   Increase knowledge test score from 13 out of 15 to 15 out of 15 by discharge.  Maintain Resting blood pressure of <130/80 while in the program.  Provide Cardiac book” Living Well with Heart Disease” and work book before patients 5th visit.    Other Core Component Interventions/Education:   Monitor Blood pressure pre, during, and post workout.  Discuss knowledge quiz results within first 6 weeks of program.  Make sure patient is compliant with their medications.  Make sure patient  continues to carry updated medication list and the importance of maintaining one.   Current BP at 145/77. 4/14  Patient is compliant with their medication and carries updated medication list. 4/14  Provided Cardiac book” Living Well with Heart Disease” and work book and explained how they will be used verbalized understanding. 5/7  Discussed knowledge quiz results verbalized understanding. 5/7  Quiz 1 5/7 Rehab and Strokes work no questions not completed.  Hand Out Overview of admitting diagnosis. Discussed verbalized understanding. 5/7  Quiz 4 5/7Medications no questions not completed.   Hand Out Medications After Heart Attack (The Basics) discussed verbalized understanding. 5/7  Hand Out Tips on Taking Medication discussed verbalized understanding. 5/7  Hand Out How medicines help prevent heart attacks discussed verbalized understanding. 5/7  Patient is compliant with their medications and continues to carry updated medication list, he has no medication changes at this time. 5/9  Current BP at 113/58. 5/9  Patient is compliant with their medications and continues to carry updated medication list, he has one medication change at this time he has had an increase in his arthritis medication.  6/2  Current BP at 126/58.  6/2  Patient is compliant with their medications and continues to carry updated medication list, he has no medication change at this time. 6/27  Current BP at 128/59. 6/27    Individual Patient Goals:    Build Stamina  Get stronger and start exercising     Goal Status: In Progress    Staff Comments:  90 Day Assessment. Patient has completed 22 sessions. He is still having a lot of pain in his knees. Recently he has had a fall on his bad knee, with this fall he has been unable to exercise. He is working towards his new set MET Goal of 6.0 current METs at 4.5 it will be increased as tolerated. He states he has had a 75% improvement in his strength and endurance since starting in Rehab. Patients' cardiac  monitor has maintained in Normal sinus rhythm throughout exercise. He states he has learned that exercise is good and to keep it up. He has no concerns at this time.    Rehab Staff Signature: MAKAYLA Mejia, RRT, RCP

## 2025-06-30 ENCOUNTER — APPOINTMENT (OUTPATIENT)
Dept: CARDIAC REHAB | Facility: HOSPITAL | Age: 80
End: 2025-06-30
Payer: MEDICARE

## 2025-07-02 ENCOUNTER — APPOINTMENT (OUTPATIENT)
Dept: CARDIAC REHAB | Facility: HOSPITAL | Age: 80
End: 2025-07-02
Payer: MEDICARE

## 2025-07-07 ENCOUNTER — APPOINTMENT (OUTPATIENT)
Dept: CARDIAC REHAB | Facility: HOSPITAL | Age: 80
End: 2025-07-07
Payer: MEDICARE

## 2025-07-09 ENCOUNTER — APPOINTMENT (OUTPATIENT)
Dept: CARDIAC REHAB | Facility: HOSPITAL | Age: 80
End: 2025-07-09
Payer: MEDICARE

## 2025-07-11 ENCOUNTER — APPOINTMENT (OUTPATIENT)
Dept: CARDIAC REHAB | Facility: HOSPITAL | Age: 80
End: 2025-07-11
Payer: MEDICARE

## 2025-07-14 ENCOUNTER — APPOINTMENT (OUTPATIENT)
Dept: CARDIAC REHAB | Facility: HOSPITAL | Age: 80
End: 2025-07-14
Payer: MEDICARE

## 2025-07-16 ENCOUNTER — APPOINTMENT (OUTPATIENT)
Dept: CARDIAC REHAB | Facility: HOSPITAL | Age: 80
End: 2025-07-16
Payer: MEDICARE

## 2025-07-28 ENCOUNTER — APPOINTMENT (OUTPATIENT)
Dept: PRIMARY CARE | Facility: CLINIC | Age: 80
End: 2025-07-28
Payer: COMMERCIAL

## 2025-07-28 VITALS
SYSTOLIC BLOOD PRESSURE: 134 MMHG | OXYGEN SATURATION: 98 % | WEIGHT: 149 LBS | DIASTOLIC BLOOD PRESSURE: 70 MMHG | BODY MASS INDEX: 22.07 KG/M2 | HEART RATE: 79 BPM | HEIGHT: 69 IN

## 2025-07-28 DIAGNOSIS — I44.2 HEART BLOCK AV COMPLETE: ICD-10-CM

## 2025-07-28 DIAGNOSIS — I25.10 CORONARY ARTERY DISEASE INVOLVING NATIVE HEART WITHOUT ANGINA PECTORIS, UNSPECIFIED VESSEL OR LESION TYPE: ICD-10-CM

## 2025-07-28 DIAGNOSIS — M15.0 PRIMARY OSTEOARTHRITIS INVOLVING MULTIPLE JOINTS: ICD-10-CM

## 2025-07-28 DIAGNOSIS — M25.50 ARTHRALGIA, UNSPECIFIED JOINT: Primary | ICD-10-CM

## 2025-07-28 PROCEDURE — 1159F MED LIST DOCD IN RCRD: CPT

## 2025-07-28 PROCEDURE — 1036F TOBACCO NON-USER: CPT

## 2025-07-28 PROCEDURE — 3078F DIAST BP <80 MM HG: CPT

## 2025-07-28 PROCEDURE — 99214 OFFICE O/P EST MOD 30 MIN: CPT

## 2025-07-28 PROCEDURE — 3075F SYST BP GE 130 - 139MM HG: CPT

## 2025-07-28 ASSESSMENT — ENCOUNTER SYMPTOMS
RESPIRATORY NEGATIVE: 1
CARDIOVASCULAR NEGATIVE: 1
CONSTITUTIONAL NEGATIVE: 1
GASTROINTESTINAL NEGATIVE: 1

## 2025-07-28 NOTE — PROGRESS NOTES
"Subjective   Patient ID: Gomez Abreu is a 79 y.o. male who presents for pt here to discuss some health issues .    HPI   BPH: Following with urology. Stable.  ARTHRALGIA: Has seen ortho, inflammation to BL hands, weakness d/t inflammation, daily. BL patella alto as well so chronic knee pain. Cannot NSAID d/t antiplatelets. S/P L TKA, recently had injury to L knee coming down stairs, following with ortho, will have MRI soon.  CAD: Following with cardio, pacemaker.    Review of Systems   Constitutional: Negative.    Respiratory: Negative.     Cardiovascular: Negative.    Gastrointestinal: Negative.        Objective   /70   Pulse 79   Ht 1.753 m (5' 9\")   Wt 67.6 kg (149 lb)   SpO2 98%   BMI 22.00 kg/m²     Physical Exam  Constitutional:       General: He is not in acute distress.     Appearance: Normal appearance. He is not ill-appearing.   HENT:      Head: Normocephalic and atraumatic.     Eyes:      Extraocular Movements: Extraocular movements intact.      Conjunctiva/sclera: Conjunctivae normal.       Cardiovascular:      Rate and Rhythm: Normal rate.   Pulmonary:      Effort: Pulmonary effort is normal.   Abdominal:      General: There is no distension.     Musculoskeletal:      Cervical back: Normal range of motion.     Skin:     General: Skin is warm and dry.     Neurological:      General: No focal deficit present.      Mental Status: He is alert and oriented to person, place, and time.     Psychiatric:         Mood and Affect: Mood normal.         Behavior: Behavior normal.         Thought Content: Thought content normal.         Judgment: Judgment normal.         Assessment/Plan        Will get rheum labs, if positive refer to Dr. Jones.  Follow up at med check with labs prior.  "

## 2025-07-29 LAB
ANA SER QL IF: NEGATIVE
CRP SERPL-MCNC: 3 MG/L
ERYTHROCYTE [SEDIMENTATION RATE] IN BLOOD BY WESTERGREN METHOD: 2 MM/H
RHEUMATOID FACT SERPL-ACNC: <10 IU/ML

## 2025-09-15 ENCOUNTER — APPOINTMENT (OUTPATIENT)
Dept: CARDIOLOGY | Facility: CLINIC | Age: 80
End: 2025-09-15
Payer: MEDICARE

## 2025-12-10 ENCOUNTER — APPOINTMENT (OUTPATIENT)
Dept: UROLOGY | Facility: CLINIC | Age: 80
End: 2025-12-10
Payer: MEDICARE

## 2025-12-18 ENCOUNTER — APPOINTMENT (OUTPATIENT)
Dept: PRIMARY CARE | Facility: CLINIC | Age: 80
End: 2025-12-18
Payer: COMMERCIAL

## (undated) DEVICE — VALVE, HEMOSTASIS, GUARDIAN II NC, W/ GUIDEWIRE INSERTION TOOL

## (undated) DEVICE — TR BAND, RADIAL COMPRESSION, STANDARD, 24CM

## (undated) DEVICE — CATHETER, BALLOON, NC EUPHORA NONCOMPLIANT 3.5 X 15 X 142CM

## (undated) DEVICE — PACING CABLE, W/ PAC-LOC, DISP

## (undated) DEVICE — ACCESS KIT, S-MAK MINI, 4FR 10CM 0.018IN 40CM, NT/PT, ECHO ENHANCE NEEDLE

## (undated) DEVICE — CATHETER, BALLOON, NC EUPHORA NONCOMPLIANT 2.5 X 12 X 142CM

## (undated) DEVICE — TRAY, PACIING ELECTRODE, 5 FR 110CM

## (undated) DEVICE — SHEATH, GLIDESHEATH, SLENDER, 6FR 10CM

## (undated) DEVICE — CATHETER, OPTITORQUE, 5FR, TIG, 1H/100CM

## (undated) DEVICE — CATHETER, VISTA BRITE TIP GUIDE, 6FR 0.070 XB 3.5 ECO

## (undated) DEVICE — KIT, NAMIC STANDARD, LEFT HEART, W/ INTEGRATED COMP MANIFOLD

## (undated) DEVICE — BAG, PERMANENT SLED , FOR IVUS CATHETER

## (undated) DEVICE — CATHETER, BALLOON DILATION, EUPHORA SEMICOMPLIANT 3.00  X 15 MM X 142CM

## (undated) DEVICE — DEVICE KIT, INFLATION, CUSTOM, PARMA

## (undated) DEVICE — GUIDEWIRE, INQWIRE, 3MM J, .035 X 210CM, FIXED

## (undated) DEVICE — PACK, ANGIO FEMORAL, CUSTOM, SMC

## (undated) DEVICE — GUIDEWIRE, UNIVERSAL BALANCE MID WEIGHT, 190CM, STR

## (undated) DEVICE — CATHETER, IVUS, OPTICROSS HD BAGLESS, NON-CE, 6FR